# Patient Record
Sex: MALE | Race: WHITE | NOT HISPANIC OR LATINO | Employment: FULL TIME | URBAN - METROPOLITAN AREA
[De-identification: names, ages, dates, MRNs, and addresses within clinical notes are randomized per-mention and may not be internally consistent; named-entity substitution may affect disease eponyms.]

---

## 2018-08-06 ENCOUNTER — APPOINTMENT (OUTPATIENT)
Dept: RADIOLOGY | Facility: MEDICAL CENTER | Age: 33
End: 2018-08-06
Attending: EMERGENCY MEDICINE
Payer: OTHER MISCELLANEOUS

## 2018-08-06 ENCOUNTER — HOSPITAL ENCOUNTER (EMERGENCY)
Facility: MEDICAL CENTER | Age: 33
End: 2018-08-06
Attending: EMERGENCY MEDICINE
Payer: OTHER MISCELLANEOUS

## 2018-08-06 VITALS
WEIGHT: 185.19 LBS | DIASTOLIC BLOOD PRESSURE: 78 MMHG | OXYGEN SATURATION: 98 % | SYSTOLIC BLOOD PRESSURE: 128 MMHG | HEIGHT: 72 IN | BODY MASS INDEX: 25.08 KG/M2 | HEART RATE: 62 BPM | TEMPERATURE: 98.2 F | RESPIRATION RATE: 15 BRPM

## 2018-08-06 DIAGNOSIS — S43.401A SPRAIN OF RIGHT SHOULDER, UNSPECIFIED SHOULDER SPRAIN TYPE, INITIAL ENCOUNTER: ICD-10-CM

## 2018-08-06 PROCEDURE — 73030 X-RAY EXAM OF SHOULDER: CPT | Mod: RT

## 2018-08-06 PROCEDURE — A9270 NON-COVERED ITEM OR SERVICE: HCPCS | Performed by: EMERGENCY MEDICINE

## 2018-08-06 PROCEDURE — 700102 HCHG RX REV CODE 250 W/ 637 OVERRIDE(OP): Performed by: EMERGENCY MEDICINE

## 2018-08-06 PROCEDURE — 99283 EMERGENCY DEPT VISIT LOW MDM: CPT

## 2018-08-06 RX ORDER — IBUPROFEN 600 MG/1
600 TABLET ORAL ONCE
Status: COMPLETED | OUTPATIENT
Start: 2018-08-06 | End: 2018-08-06

## 2018-08-06 RX ORDER — ACETAMINOPHEN 325 MG/1
650 TABLET ORAL ONCE
Status: COMPLETED | OUTPATIENT
Start: 2018-08-06 | End: 2018-08-06

## 2018-08-06 RX ADMIN — IBUPROFEN 600 MG: 600 TABLET, FILM COATED ORAL at 16:00

## 2018-08-06 RX ADMIN — ACETAMINOPHEN 650 MG: 325 TABLET, FILM COATED ORAL at 16:00

## 2018-08-06 ASSESSMENT — PAIN SCALES - WONG BAKER: WONGBAKER_NUMERICALRESPONSE: HURTS JUST A LITTLE BIT

## 2018-08-06 NOTE — LETTER
FORM C-4:  EMPLOYEE’S CLAIM FOR COMPENSATION/ REPORT OF INITIAL TREATMENT  EMPLOYEE’S CLAIM - PROVIDE ALL INFORMATION REQUESTED   First Name  Shadi Last Name  Dana Birthdate             Age  1985 33 y.o. 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 (if applicable)  Building Fence    How did this injury or occupational disease occur? Be specific and answer in detail. Use additional sheet if necessary)  Pounding fence post and continuing working for 2 days   If you believe that you have an occupational disease, when did you first have knowledge of the disability and it relationship to your employment?  n/a Witnesses to the Accident  Dm Card     Nature of Injury or Occupational Disease  Workers' Compensation  Part(s) of Body Injured or Affected  Shoulder (R), N/A, N/A    I certify that the above is true and correct to the best of my knowledge and that I have provided this information in  order to obtain the benefits of [place]  I hereby authorize any physician, chiropractor, surgeon, practitioner, or other person, any hospital, including [facility] or [facility] hospital, any medical service organization, any insurance company, or other institution or organization to release to each other, any medical or other information, including benefits paid or payable, pertinent to this injury or disease, except information relative to diagnosis, treatment and/or counseling for AIDS, psychological conditions, alcohol or controlled substances, for which I must give specific authorization.  A Photostat of this authorization shall be as valid as the original.   Date 08/06/2018 Place  [facility] Employee’s Signature   THIS REPORT MUST BE COMPLETED AND MAILED WITHIN 3 WORKING DAYS OF TREATMENT   Place  [facility], EMERGENCY DEPT  Name of Facility   [facility]   Date  8/6/2018 Diagnosis  (S43.401A) Sprain of right shoulder, unspecified shoulder sprain type, initial encounter Is there evidence the injured employee was under the influence of alcohol and/or another controlled substance at the time of accident?   Hour  5:46 PM Description of Injury or Disease  Sprain of right shoulder, unspecified shoulder sprain type, initial encounter No   Treatment  Sling  Have you advised the patient to remain off work five days or more?         No   X-Ray Findings  Negative   If Yes   From Date    To Date      From information given by the employee, together with medical evidence, can you directly connect this injury or occupational disease as job incurred?  Yes If No, is the employee capable of: Full Duty    Modified Duty  Yes   Is additional medical care by a physician indicated?  Yes If Modified Duty, Specify any Limitations / Restrictions  No use of the right arm for the next 4  "days     Do you know of any previous injury or disease contributing to this condition or occupational disease?  No   Date  8/6/2018 Print Doctor’s Name  Se Ramos certify the employer’s copy of this form was mailed on:   Address  [address] [address] [phone] Insurer’s Use Only   [facility]  [address]    Provider’s Tax ID Number  [id number] Telephone  Dept: [phone]    Doctor’s Signature  e-SignSE RAMOS M.D. Degree   MD    Original - TREATING PHYSICIAN OR CHIROPRACTOR   Pg 2-Insurer/TPA   Pg 3-Employer   Pg 4-Employee                                                                                                  Form C-4 (rev01/03)     BRIEF DESCRIPTION OF RIGHTS AND BENEFITS  (Pursuant to [place] 616C.050)    Notice of Injury or Occupational Disease (Incident Report Form C-1): If an injury or occupational disease (OD) arises out of and in the course of employment, you must provide written notice to your employer as soon as practicable, but no later than 7 days after the accident or OD. Your employer shall maintain a sufficient supply of the required forms.  Claim for Compensation (Form C-4): If medical treatment is sought, the form C-4 is available at the place of initial treatment. A completed \"Claim for Compensation\" (Form C-4) must be filed within 90 days after an accident or OD. The treating physician or chiropractor must, within 3 working days after treatment, complete and mail to the employer, the employer's insurer and third-party , the Claim for Compensation.  Medical Treatment: If you require medical treatment for your on-the-job injury or OD, you may be required to select a physician or chiropractor from a list provided by your workers’ compensation insurer, if it has contracted with an Organization for Managed Care (MCO) or Preferred Provider Organization (PPO) or providers of health care. If your employer has not entered into a " contract with an MCO or PPO, you may select a physician or chiropractor from the Panel of Physicians and Chiropractors. Any medical costs related to your industrial injury or OD will be paid by your insurer.  Temporary Total Disability (TTD): If your doctor has certified that you are unable to work for a period of at least 5 consecutive days, or 5 cumulative days in a 20-day period, or places restrictions on you that your employer does not accommodate, you may be entitled to TTD compensation.  Temporary Partial Disability (TPD): If the wage you receive upon reemployment is less than the compensation for TTD to which you are entitled, the insurer may be required to pay you TPD compensation to make up the difference. TPD can only be paid for a maximum of 24 months.  Permanent Partial Disability (PPD): When your medical condition is stable and there is an indication of a PPD as a result of your injury or OD, within 30 days, your insurer must arrange for an evaluation by a rating physician or chiropractor to determine the degree of your PPD. The amount of your PPD award depends on the date of injury, the results of the PPD evaluation and your age and wage.  Permanent Total Disability (PTD): If you are medically certified by a treating physician or chiropractor as permanently and totally disabled and have been granted a PTD status by your insurer, you are entitled to receive monthly benefits not to exceed 66 2/3% of your average monthly wage. The amount of your PTD payments is subject to reduction if you previously received a PPD award.  Vocational Rehabilitation Services: You may be eligible for vocational rehabilitation services if you are unable to return to the job due to a permanent physical impairment or permanent restrictions as a result of your injury or occupational disease.  Transportation and Per Ish Reimbursement: You may be eligible for travel expenses and per ish associated with medical  treatment.  Reopening: You may be able to reopen your claim if your condition worsens after claim closure.  Appeal Process: If you disagree with a written determination issued by the insurer or the insurer does not respond to your request, you may appeal to the Department of Administration, , by following the instructions contained in your determination letter. You must appeal the determination within 70 days from the date of the determination letter at [address], [address], [place], [place] [address], or [address], [address], [place], [place] [address]. If you disagree with the  decision, you may appeal to the Department of Administration, . You must file your appeal within 30 days from the date of the  decision letter at [address], [address], [place], [place] [address], or [address], [address], [place], [place] [address]. If you disagree with a decision of an , you may file a petition for judicial review with the District Court. You must do so within 30 days of the Appeal Officer’s decision. You may be represented by an  at your own expense or you may contact the [facility] for possible representation.  [place]  for Injured Workers (NAIW): If you disagree with a  decision, you may request that NAIW represent you without charge at an  Hearing. For information regarding denial of benefits, you may contact the [facility] at: [address], [address], [place] [address], [phone], or [address], [address], [place] [address], [phone]  To File a Complaint with the Division: If you wish to file a complaint with the  of the Division of Industrial Relations (DIR), please contact the Workers’ Compensation Section, [address], [address], [place], [place] [address], telephone [phone], or [address], [address], [place], [place] [address], telephone [phone]) [phone].  For assistance with Workers’ Compensation Issues: you may contact the Office of the Governor Consumer Health Assistance, [address] [place] [address], [address], [place], [place] [address], Toll Free 1-468.864.8805, Web site: http://[employer] [place] [email or web address]., E-mail [email or web address]                                                                                                                                                                               __________________________________________________________________                                    08/06/2018            Employee Name / Signature                                                                                                                            Date                                       D-2 (rev. 10/07)

## 2018-08-06 NOTE — ED PROVIDER NOTES
ED Provider Note    CHIEF COMPLAINT  Chief Complaint   Patient presents with   • Shoulder Pain     R shoulder, started last night, worse this morning, psent 2 days pounding fence posts, no swelling noticed       HPI  Shadi Cortez is a 33 y.o. male who presents for evaluation of right shoulder pain, he was hammering fence post into the ground setting up for burning man when he injured his right shoulder 2 days ago.  No weakness, numbness, no fever.  Has been using it with increasing pain since then.  No neck or back pain, no other complaints    REVIEW OF SYSTEMS  Negative for fever, weakness, numbness, neck pain, back pain.    PAST MEDICAL HISTORY       SOCIAL HISTORY  Social History     Social History Main Topics   • Smoking status: Current Every Day Smoker   • Smokeless tobacco: Never Used   • Alcohol use Yes   • Drug use: Yes     Types: Inhaled      Comment: marijuana   • Sexual activity: Not on file       SURGICAL HISTORY   has a past surgical history that includes hernia repair.    CURRENT MEDICATIONS  I personally reviewed the medication list in the charting documentation.     ALLERGIES  No Known Allergies    PHYSICAL EXAM  VITAL SIGNS: /77   Pulse 62   Temp 36.8 °C (98.2 °F)   Resp 18   Ht 1.829 m (6')   Wt 84 kg (185 lb 3 oz)   SpO2 98%   BMI 25.12 kg/m²   Constitutional: Alert in no apparent distress.  HENT: No signs of trauma.   Eyes: Conjunctiva normal, Non-icteric.   Chest: Normal nonlabored respirations  Skin: No erythema, No rash.   Musculoskeletal: Limited range of motion of the right shoulder but normal inspection without edema or deformity, distally neurovascularly intact with normal radial pulse and sensation.  Neurologic: Alert, No focal deficits noted.   Psychiatric: Affect normal, Judgment normal.    DIAGNOSTIC STUDIES / PROCEDURES    RADIOLOGY  DX-SHOULDER 2+ RIGHT   Final Result      No acute osseous abnormality.            COURSE & MEDICAL DECISION MAKING  Pertinent  Labs & Imaging studies reviewed. (See chart for details)    Encounter Summary: This is a 33 y.o. male with right sided shoulder pain after overuse with a hammer setting of a burning man, no focal neurologic complaints or findings on exam, limited range of motion on exam however.  He is neurovascularly intact.  Will place in a sling as the x-ray is negative for acute fractures, he will follow-up with orthopedics.  Strict return instructions discussed.      DISPOSITION: Discharge Home      FINAL IMPRESSION  1. Sprain of right shoulder, unspecified shoulder sprain type, initial encounter        This dictation was created using voice recognition software. The accuracy of the dictation is limited to the abilities of the software. I expect there may be some errors of grammar and possibly content. The nursing notes were reviewed and certain aspects of this information were incorporated into this note.    Electronically signed by: Se Ramos, 8/6/2018 4:37 PM

## 2018-08-06 NOTE — ED TRIAGE NOTES
"Ambulates to triage  Chief Complaint   Patient presents with   • Shoulder Pain     R shoulder, started last night, worse this morning, psent 2 days pounding fence posts, no swelling noticed     Pain increases when he lifts his arm to shoulder height, \"sharp shooting pain travels down R arm.\"  "

## 2018-08-06 NOTE — LETTER
FORM C-4:  EMPLOYEE’S CLAIM FOR COMPENSATION/ REPORT OF INITIAL TREATMENT  EMPLOYEE’S CLAIM - PROVIDE ALL INFORMATION REQUESTED   First Name  Shadi Last Name  Dana Birthdate             Age  1985 33 y.o. 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 (if applicable)  Building Fence    How did this injury or occupational disease occur? Be specific and answer in detail. Use additional sheet if necessary)  Pounding fence post and continuing working for 2 days   If you believe that you have an occupational disease, when did you first have knowledge of the disability and it relationship to your employment?  n/a Witnesses to the Accident  Dm Card     Nature of Injury or Occupational Disease  Workers' Compensation  Part(s) of Body Injured or Affected  Shoulder (R), N/A, N/A    I certify that the above is true and correct to the best of my knowledge and that I have provided this information in  order to obtain the benefits of [place] inclusive or Chapter 617 of [place]RS).  I hereby authorize any physician, chiropractor, surgeon, practitioner, or other person, any hospital, including [facility] or [facility] hospital, any medical service organization, any insurance company, or other institution or organization to release to each other, any medical or other information, including benefits paid or payable, pertinent to this injury or disease, except information relative to diagnosis, treatment and/or counseling for AIDS, psychological conditions, alcohol or controlled substances, for which I must give specific authorization.  A Photostat of this authorization shall be as valid as the original.   Date 08/06/2018 Place  [facility] Employee’s Signature   THIS REPORT MUST BE COMPLETED AND MAILED WITHIN 3 WORKING DAYS OF TREATMENT   Place  [facility], EMERGENCY DEPT  Name of Facility   [facility]   Date  8/6/2018 Diagnosis  (S43.401A) Sprain of right shoulder, unspecified shoulder sprain type, initial encounter Is there evidence the injured employee was under the influence of alcohol and/or another controlled substance at the time of accident?   Hour  5:23 PM Description of Injury or Disease  Sprain of right shoulder, unspecified shoulder sprain type, initial encounter     Treatment     Have you advised the patient to remain off work five days or more?             X-Ray Findings      If Yes   From Date    To Date      From information given by the employee, together with medical evidence, can you directly connect this injury or occupational disease as job incurred?    If No, is the employee capable of: Full Duty    Modified Duty      Is additional medical care by a physician indicated?    If Modified Duty, Specify any Limitations / Restrictions        Do you know of any previous injury or disease  "contributing to this condition or occupational disease?      Date  8/6/2018 Print Doctor’s Name  Se Ramos MAIDA certify the employer’s copy of this form was mailed on:   Address  [address] [address] [phone] Insurer’s Use Only   [facility]  [address]    Provider’s Tax ID Number  [id number] Telephone  Dept: [phone]    Doctor’s Signature    Degree       Original - TREATING PHYSICIAN OR CHIROPRACTOR   Pg 2-Insurer/TPA   Pg 3-Employer   Pg 4-Employee                                                                                                  Form C-4 (rev01/03)     BRIEF DESCRIPTION OF RIGHTS AND BENEFITS  (Pursuant to NRS 616C.050)    Notice of Injury or Occupational Disease (Incident Report Form C-1): If an injury or occupational disease (OD) arises out of and in the course of employment, you must provide written notice to your employer as soon as practicable, but no later than 7 days after the accident or OD. Your employer shall maintain a sufficient supply of the required forms.    Claim for Compensation (Form C-4): If medical treatment is sought, the form C-4 is available at the place of initial treatment. A completed \"Claim for Compensation\" (Form C-4) must be filed within 90 days after an accident or OD. The treating physician or chiropractor must, within 3 working days after treatment, complete and mail to the employer, the employer's insurer and third-party , the Claim for Compensation.    Medical Treatment: If you require medical treatment for your on-the-job injury or OD, you may be required to select a physician or chiropractor from a list provided by your workers’ compensation insurer, if it has contracted with an Organization for Managed Care (MCO) or Preferred Provider Organization (PPO) or providers of health care. If your employer has not entered into a contract with an MCO or PPO, you may select a physician or chiropractor from the " Panel of Physicians and Chiropractors. Any medical costs related to your industrial injury or OD will be paid by your insurer.    Temporary Total Disability (TTD): If your doctor has certified that you are unable to work for a period of at least 5 consecutive days, or 5 cumulative days in a 20-day period, or places restrictions on you that your employer does not accommodate, you may be entitled to TTD compensation.    Temporary Partial Disability (TPD): If the wage you receive upon reemployment is less than the compensation for TTD to which you are entitled, the insurer may be required to pay you TPD compensation to make up the difference. TPD can only be paid for a maximum of 24 months.    Permanent Partial Disability (PPD): When your medical condition is stable and there is an indication of a PPD as a result of your injury or OD, within 30 days, your insurer must arrange for an evaluation by a rating physician or chiropractor to determine the degree of your PPD. The amount of your PPD award depends on the date of injury, the results of the PPD evaluation and your age and wage.    Permanent Total Disability (PTD): If you are medically certified by a treating physician or chiropractor as permanently and totally disabled and have been granted a PTD status by your insurer, you are entitled to receive monthly benefits not to exceed 66 2/3% of your average monthly wage. The amount of your PTD payments is subject to reduction if you previously received a PPD award.    Vocational Rehabilitation Services: You may be eligible for vocational rehabilitation services if you are unable to return to the job due to a permanent physical impairment or permanent restrictions as a result of your injury or occupational disease.    Transportation and Per Ish Reimbursement: You may be eligible for travel expenses and per ish associated with medical treatment.  Reopening: You may be able to reopen your claim if your condition worsens  after claim closure.    Appeal Process: If you disagree with a written determination issued by the insurer or the insurer does not respond to your request, you may appeal to the Department of Administration, , by following the instructions contained in your determination letter. You must appeal the determination within 70 days from the date of the determination letter at [address], [address], [place], [place] [address], or [address], [place], [place] [address]. If you disagree with the  decision, you may appeal to the Department of Administration, . You must file your appeal within 30 days from the date of the  decision letter at [address], [address], [place], [place] [address], or [address], [address], [place], [place] [address]. If you disagree with a decision of an , you may file a petition for judicial review with the District Court. You must do so within 30 days of the Appeal Officer’s decision. You may be represented by an  at your own expense or you may contact the [facility] for possible representation.    [place]  for Injured Workers (NAIW): If you disagree with a  decision, you may request that NAIW represent you without charge at an  Hearing. For information regarding denial of benefits, you may contact the NA at: [address], [address], [place] [address], [phone]) [phone], or [address], [address], [place] [address], [phone]    To File a Complaint with the Division: If you wish to file a complaint with the  of the Division of Industrial Relations (DIR), please contact the Workers’ Compensation Section, [address], [address], [place], [place] [address], telephone (020) [phone], or [address], [place] [address], telephone ([phone]) [phone].    For  assistance with Workers’ Compensation Issues: you may contact the Office of the Governor Consumer Health Assistance, [address] [place] [address], [place] [address], [place] [address], Toll Free 1-457.210.4785, Web site: http://govcha.[facility].[email or web address]., E-mail [email or web address].                                                                                                                                                                               __________________________________________________________________                                    _________________            Employee Name / Signature                                                                                                                            Date                                       D-2 (rev. 10/07)

## 2018-08-06 NOTE — LETTER
FORM C-4:  EMPLOYEE’S CLAIM FOR COMPENSATION/ REPORT OF INITIAL TREATMENT  EMPLOYEE’S CLAIM - PROVIDE ALL INFORMATION REQUESTED   First Name  Shadi Last Name  Dana Birthdate             Age  1985 33 y.o. 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 (if applicable)  Building Fence    How did this injury or occupational disease occur? Be specific and answer in detail. Use additional sheet if necessary)  Pounding fence post and continuing working for 2 days   If you believe that you have an occupational disease, when did you first have knowledge of the disability and it relationship to your employment?  n/a Witnesses to the Accident  Dm Card     Nature of Injury or Occupational Disease  Workers' Compensation  Part(s) of Body Injured or Affected  Shoulder (R), N/A, N/A    I certify that the above is true and correct to the best of my knowledge and that I have provided this information in order to obtain the benefits of [place]’s [place] and [place] Acts ([place] 616A to  616D, inclusive or Chapter 617 of NRS).  I hereby authorize any physician, chiropractor, surgeon, practitioner, or other person, any hospital, including [facility] or [facility] hospital, any medical service organization, any insurance company, or other institution or organization to release to each other, any medical or other information, including benefits paid or payable, pertinent to this injury or disease, except information relative to diagnosis, treatment and/or counseling for AIDS, psychological conditions, alcohol or controlled substances, for which I must give specific authorization.  A Photostat of this authorization shall be as valid as the original.   Date Place   Employee’s Signature   THIS REPORT MUST BE COMPLETED AND MAILED WITHIN 3 WORKING DAYS OF TREATMENT   Place  [facility], EMERGENCY DEPT  Name of Facility   [facility]   Date  8/6/2018 Diagnosis  (S43.401A) Sprain of right shoulder, unspecified shoulder sprain type, initial encounter Is there evidence the injured employee was under the influence of alcohol and/or another controlled substance at the time of accident?   Hour  5:21 PM Description of Injury or Disease  Sprain of right shoulder, unspecified shoulder sprain type, initial encounter     Treatment     Have you advised the patient to remain off work five days or more?             X-Ray Findings      If Yes   From Date    To Date      From information given by the employee, together with medical evidence, can you directly connect this injury or occupational disease as job incurred?    If No, is the employee capable of: Full Duty    Modified Duty      Is additional medical care by a physician indicated?    If Modified Duty, Specify any Limitations / Restrictions        Do you know of any previous injury or disease contributing to this condition or occupational disease?      Date  8/6/2018 Print Doctor’s Name  Se Ramos certify  "the employer’s copy of this form was mailed on:   Address  [address]  [place] [id number] [phone] Insurer’s Use Only   [facility]  [address]    Provider’s Tax ID Number  [id number] Telephone  Dept: [phone]    Doctor’s Signature    Degree       Original - TREATING PHYSICIAN OR CHIROPRACTOR   Pg 2-Insurer/TPA   Pg 3-Employer   Pg 4-Employee                                                                                                  Form C-4 (rev01/03)     BRIEF DESCRIPTION OF RIGHTS AND BENEFITS  (Pursuant to NRS 616C.050)    Notice of Injury or Occupational Disease (Incident Report Form C-1): If an injury or occupational disease (OD) arises out of and in the course of employment, you must provide written notice to your employer as soon as practicable, but no later than 7 days after the accident or OD. Your employer shall maintain a sufficient supply of the required forms.    Claim for Compensation (Form C-4): If medical treatment is sought, the form C-4 is available at the place of initial treatment. A completed \"Claim for Compensation\" (Form C-4) must be filed within 90 days after an accident or OD. The treating physician or chiropractor must, within 3 working days after treatment, complete and mail to the employer, the employer's insurer and third-party , the Claim for Compensation.    Medical Treatment: If you require medical treatment for your on-the-job injury or OD, you may be required to select a physician or chiropractor from a list provided by your workers’ compensation insurer, if it has contracted with an Organization for Managed Care (MCO) or Preferred Provider Organization (PPO) or providers of health care. If your employer has not entered into a contract with an MCO or PPO, you may select a physician or chiropractor from the Panel of Physicians and Chiropractors. Any medical costs related to your industrial injury or OD will be paid by your " insurer.    Temporary Total Disability (TTD): If your doctor has certified that you are unable to work for a period of at least 5 consecutive days, or 5 cumulative days in a 20-day period, or places restrictions on you that your employer does not accommodate, you may be entitled to TTD compensation.    Temporary Partial Disability (TPD): If the wage you receive upon reemployment is less than the compensation for TTD to which you are entitled, the insurer may be required to pay you TPD compensation to make up the difference. TPD can only be paid for a maximum of 24 months.    Permanent Partial Disability (PPD): When your medical condition is stable and there is an indication of a PPD as a result of your injury or OD, within 30 days, your insurer must arrange for an evaluation by a rating physician or chiropractor to determine the degree of your PPD. The amount of your PPD award depends on the date of injury, the results of the PPD evaluation and your age and wage.    Permanent Total Disability (PTD): If you are medically certified by a treating physician or chiropractor as permanently and totally disabled and have been granted a PTD status by your insurer, you are entitled to receive monthly benefits not to exceed 66 2/3% of your average monthly wage. The amount of your PTD payments is subject to reduction if you previously received a PPD award.    Vocational Rehabilitation Services: You may be eligible for vocational rehabilitation services if you are unable to return to the job due to a permanent physical impairment or permanent restrictions as a result of your injury or occupational disease.    Transportation and Per Ish Reimbursement: You may be eligible for travel expenses and per ish associated with medical treatment.  Reopening: You may be able to reopen your claim if your condition worsens after claim closure.    Appeal Process: If you disagree with a written determination issued by the insurer or the  insurer does not respond to your request, you may appeal to the Department of Administration, , by following the instructions contained in your determination letter. You must appeal the determination within 70 days from the date of the determination letter at [address], [address], [place], [place] [address], or [address], [address], [place], [place] [address]. If you disagree with the  decision, you may appeal to the Department of Administration, . You must file your appeal within 30 days from the date of the  decision letter at [address], [address], [place], [place] [address], or [address], [address], [place], [place] [address]. If you disagree with a decision of an , you may file a petition for judicial review with the District Court. You must do so within 30 days of the Appeal Officer’s decision. You may be represented by an  at your own expense or you may contact the [facility] for possible representation.    [place]  for Injured Workers (NAIW): If you disagree with a  decision, you may request that NAIW represent you without charge at an  Hearing. For information regarding denial of benefits, you may contact the [facility] at: [address], [address], [place] [address], [phone]) [phone], or [address], [address], [place] [address], [phone]    To File a Complaint with the Division: If you wish to file a complaint with the  of the Division of Industrial Relations (DIR), please contact the Workers’ Compensation Section, [address], [address], [place], [place] [address], telephone (816) 627-7091, or [address], [place] [address], telephone [phone]) [phone].    For assistance with Workers’ Compensation Issues: you may contact the Office of the [facility] Consumer Health Assistance, [address], [address], [place], [place] [address], Toll Free 1-905.802.1149, Web site: http://[email or web address].[email or web address]., E-mail [email or web address].                                                                                                                                                                               __________________________________________________________________                                    _________________            Employee Name / Signature                                                                                                                            Date                                       D-2 (rev. 10/07)

## 2018-08-07 NOTE — ED NOTES
Pt verbalized understanding of D/C instructions, pt PWD, VSS, NAD, pt left walking with steady gait

## 2019-01-20 ENCOUNTER — OCCUPATIONAL MEDICINE (OUTPATIENT)
Dept: URGENT CARE | Facility: PHYSICIAN GROUP | Age: 34
End: 2019-01-20
Payer: COMMERCIAL

## 2019-01-20 VITALS
BODY MASS INDEX: 23.84 KG/M2 | HEART RATE: 80 BPM | WEIGHT: 176 LBS | SYSTOLIC BLOOD PRESSURE: 104 MMHG | OXYGEN SATURATION: 97 % | DIASTOLIC BLOOD PRESSURE: 64 MMHG | HEIGHT: 72 IN | TEMPERATURE: 97.7 F

## 2019-01-20 DIAGNOSIS — S46.911A SHOULDER STRAIN, RIGHT, INITIAL ENCOUNTER: Primary | ICD-10-CM

## 2019-01-20 PROCEDURE — 99203 OFFICE O/P NEW LOW 30 MIN: CPT | Mod: 29 | Performed by: NURSE PRACTITIONER

## 2019-01-20 ASSESSMENT — ENCOUNTER SYMPTOMS
FOCAL WEAKNESS: 0
CARDIOVASCULAR NEGATIVE: 1
SHORTNESS OF BREATH: 0
GASTROINTESTINAL NEGATIVE: 1
DIZZINESS: 0
TINGLING: 0
RESPIRATORY NEGATIVE: 1
EYES NEGATIVE: 1
PSYCHIATRIC NEGATIVE: 1
NEUROLOGICAL NEGATIVE: 1
CONSTITUTIONAL NEGATIVE: 1
SENSORY CHANGE: 0

## 2019-01-20 ASSESSMENT — PAIN SCALES - GENERAL: PAINLEVEL: 4=SLIGHT-MODERATE PAIN

## 2019-01-20 NOTE — LETTER
"   [facility] [place]  [address]  [place] [address]  Phone:  [phone] - Fax:  [phone]   [facility] Progress Report and Disability Certification  Date of Service: 1/20/2019   No Show:  No  Date / Time of Next Visit: 1/23/2019   Claim Information   Patient Name: Shadi Cortez  Claim Number:     Employer:  [employer] Date of Injury: 1/19/2019     Insurer / TPA: [employer]  ID / SSN:     Occupation:   Diagnosis: The encounter diagnosis was Shoulder strain, right, initial encounter.    Medical Information   Related to Industrial Injury? Yes    Subjective Complaints:  DOI: 1/19/2019. WC. Initial visit. He works as a  for [employer]. 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 The unit impacted part of the line while he was holding it and sent a \"shock wave\" up his arm and now has right shoulder pain. Rates pain as 4/10 which has improved since last night. He has been taking ibuprofen, acetaminophen, and resting it. He reports history of R shoulder sprain last year which has been stable. Some pain with ROM of right shoulder, otherwise denies numbness, tingling, weakness, or other symptoms.   Objective Findings: Physical Exam   Constitutional: He is oriented to person, place, and time. He appears well-developed and well-nourished. He is cooperative. No distress.   HENT:   Head: Normocephalic.   Right Ear: External ear normal.   Left Ear: External ear normal.   Nose: Nose normal.   Eyes: Conjunctivae and EOM are normal.   Neck: Normal range of motion.   Cardiovascular: Normal rate, regular rhythm, normal heart sounds and normal pulses.    Pulmonary/Chest: Effort normal and breath sounds normal. No respiratory distress.   Abdominal: Soft. Bowel sounds are normal.   Musculoskeletal: He exhibits no deformity.        Right shoulder: He exhibits decreased range of motion " (minimal, due to pain) and pain. He exhibits no tenderness, no bony tenderness, no swelling, no deformity and no laceration.        Right hand: Normal. He exhibits normal capillary refill. Normal sensation noted. Normal strength noted.   Neurological: He is alert and oriented to person, place, and time. He has normal strength. No sensory deficit.   Skin: Skin is warm and dry. Capillary refill takes less than 2 seconds.   Psychiatric: He has a normal mood and affect.   Vitals reviewed.   Pre-Existing Condition(s):     Assessment:   Initial Visit    Status: Additional Care Required  Permanent Disability:No    Plan:      Diagnostics:      Comments:  Patient will be released to restricted duty consisting of no pulling, pushing, lifting, or reaching with right arm; no climbing. Continue with OTC ibuprofen, acetaminophen, and RICE. Follow-up in 3 days on 1/23/2019. Return sooner if symptoms worsen.    Disability Information   Status: Released to Restricted Duty    From:  1/20/2019  Through: 1/23/2019 Restrictions are: Temporary   Physical Restrictions   Sitting:    Standing:    Stooping:    Bending:      Squatting:    Walking:    Climbing:    Pushing:      Pulling:    Other:    Reaching Above Shoulder (L):   Reaching Above Shoulder (R):       Reaching Below Shoulder (L):    Reaching Below Shoulder (R):      Not to exceed Weight Limits   Carrying(hrs):   Weight Limit(lb):   Lifting(hrs):   Weight  Limit(lb):     Comments: No pulling, pushing, lifting, or reaching with right arm; no climbing.    Repetitive Actions   Hands: i.e. Fine Manipulations from Grasping:     Feet: i.e. Operating Foot Controls:     Driving / Operate Machinery:     Physician Name: JOSE DANIEL Guajardo Physician Signature: JOSÉ Miller e-Signature: Dr. Carlos Carrizales, Medical Director   Clinic Name / Location: [facility] [address] [address] Clinic Phone Number: Dept: [phone]      Appointment Time: 9:30 Am Visit Start Time: 9:53 AM   Check-In Time:  9:34 Am Visit Discharge Time:  10:20 AM   Original-Treating Physician or Chiropractor    Page 2-Insurer/TPA    Page 3-Employer    Page 4-Employee

## 2019-01-20 NOTE — PROGRESS NOTES
"Subjective:     Shadi Cortez is a 33 y.o. male who presents for Work-Related Injury (DOI 1/18/19, Right shoulder strain)    DOI: 1/19/2019. WC. Initial visit. He works as a  for [employer]. 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 The unit impacted part of the line while he was holding it and sent a \"shock wave\" up his arm and now has right shoulder pain. Rates pain as 4/10 which has improved since last night. He has been taking ibuprofen, acetaminophen, and resting it. He reports history of R shoulder sprain last year which has been stable. Some pain with ROM of right shoulder, otherwise denies numbness, tingling, weakness, or other symptoms.  HPI    PMH:  has no past medical history on file.    MEDS: No current outpatient prescriptions on file.    ALLERGIES: No Known Allergies    SURGHX:   Past Surgical History:   Procedure Laterality Date   • HERNIA REPAIR       SOCHX:  reports that he has been smoking.  He has never used smokeless tobacco. He reports that he drinks alcohol. He reports that he uses drugs, including Inhaled.     FH: Reviewed with patient, not pertinent to this visit.     Review of Systems   Constitutional: Negative.    HENT: Negative.    Eyes: Negative.    Respiratory: Negative.  Negative for shortness of breath.    Cardiovascular: Negative.  Negative for chest pain.   Gastrointestinal: Negative.    Genitourinary: Negative.    Musculoskeletal:        R shoulder pain   Skin: Negative.  Negative for rash.   Neurological: Negative.  Negative for dizziness, tingling, sensory change and focal weakness.   Psychiatric/Behavioral: Negative.    All other systems reviewed and are negative.    Objective:     /64   Pulse 80   Temp 36.5 °C (97.7 °F) (Temporal)   Ht 1.829 m (6')   Wt 79.8 kg (176 lb)   SpO2 97%   BMI 23.87 kg/m²     Physical Exam   Constitutional: He is oriented to person, place, and time. He appears " well-developed and well-nourished. He is cooperative. No distress.   HENT:   Head: Normocephalic.   Right Ear: External ear normal.   Left Ear: External ear normal.   Nose: Nose normal.   Eyes: Conjunctivae and EOM are normal.   Neck: Normal range of motion.   Cardiovascular: Normal rate, regular rhythm, normal heart sounds and normal pulses.    Pulmonary/Chest: Effort normal and breath sounds normal. No respiratory distress.   Abdominal: Soft. Bowel sounds are normal.   Musculoskeletal: He exhibits no deformity.        Right shoulder: He exhibits decreased range of motion (minimal, due to pain) and pain. He exhibits no tenderness, no bony tenderness, no swelling, no deformity and no laceration.        Right hand: Normal. He exhibits normal capillary refill. Normal sensation noted. Normal strength noted.   Neurological: He is alert and oriented to person, place, and time. He has normal strength. No sensory deficit.   Skin: Skin is warm and dry. Capillary refill takes less than 2 seconds.   Psychiatric: He has a normal mood and affect.   Vitals reviewed.    Assessment/Plan:     1. Shoulder strain, right, initial encounter    Patient will be released to restricted duty consisting of no pulling, pushing, lifting, or reaching with right arm; no climbing. Continue with OTC ibuprofen, acetaminophen, and RICE. Follow-up in 3 days on 1/23/2019. Return sooner if symptoms worsen.    Patient advised to: Return in 3 days (on 1/23/2019).    Differential diagnosis, natural history, supportive care, and indications for immediate follow-up discussed. All questions answered. Patient agrees with the plan of care.

## 2019-01-20 NOTE — LETTER
EMPLOYEE’S CLAIM FOR COMPENSATION/ REPORT OF INITIAL TREATMENT  FORM C-4    EMPLOYEE’S CLAIM - PROVIDE ALL INFORMATION REQUESTED   First Name  Shadi Last Name  Dana Birthdate                    1985                Sex  male Claim Number   Home Address  [address] Age  33 y.o. Height  1.829 m (6') Weight  79.8 kg (176 lb) [facility]     [facility] [place]  [address] Telephone  [phone] (home)    Mailing Address  [address] [facility] [place]  [address] Primary Language Spoken  English    Insurer  [employer] Third Party   [employer]   Employee's Occupation (Job Title) When Injury or Occupational Disease Occurred      Employer's Name    [employer] Telephone   [phone]   Employer Address   [address] [facility] Zip   [address]   Date of Injury  1/19/2019               Hour of Injury  10:00 PM Date Employer Notified  1/19/2019 Last Day of Work after Injury or Occupational Disease  1/19/2019 Supervisor to Whom Injury Reported  Chiki Orr   Address or Location of Accident (if applicable)  [[address]   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 (Be specific an answer in detail. Use additional sheet if necessary)  Pulling a heavy unit on the line. It shifted and I tried to slow it down. Unit impacted part of line while I was holding it. Sent shock wave up my arm. Shoulder pain.   If you believe that you have an occupational disease, when did you first have knowledge of the disability and it relationship to your employment?   Witnesses to the Accident  Myles Collado      Nature of Injury or Occupational Disease  Workers' Compensation  Part(s) of Body Injured or Affected  Shoulder (R), N/A, N/A    I certify that the above is true  and correct to the best of my knowledge and that I have provided this information in order to obtain the benefits of [place]  I hereby authorize any physician, chiropractor, surgeon, practitioner, or other person, any hospital, including [facility] or [facility], any medical service organization, any insurance company, or other institution or organization to release to each other, any medical or other information, including benefits paid or payable, pertinent to this injury or disease, except information relative to diagnosis, treatment and/or counseling for AIDS, psychological conditions, alcohol or controlled substances, for which I must give specific authorization.  A Photostat of this authorization shall be as valid as the original.     Date   Place   Employee’s Signature   THIS REPORT MUST BE COMPLETED AND MAILED WITHIN 3 WORKING DAYS OF TREATMENT   Place  [facility]  Name of Facility  [facility]   Date  1/20/2019 Diagnosis  (S46.911A) Shoulder strain, right, initial encounter  (primary encounter diagnosis) Is there evidence the injured employee was under the influence of alcohol and/or another controlled substance at the time of accident?   Hour  9:53 AM Description of Injury or Disease  The encounter diagnosis was Shoulder strain, right, initial encounter. No   Treatment  Patient will be released to restricted duty consisting of no pulling, pushing, lifting, or reaching with right arm; no climbing. Continue with OTC ibuprofen, acetaminophen, and RICE. Follow-up in 3 days on 1/23/2019. Return sooner if symptoms worsen.  Have you advised the patient to remain off work five days or more? No   X-Ray Findings      If Yes   From Date  To Date      From information given by the employee, together with medical evidence, can you directly connect this injury or occupational disease as  "job incurred?  Yes If No Full Duty  No Modified Duty  Yes   Is additional medical care by a physician indicated?  Yes If Modified Duty, Specify any Limitations / Restrictions  No pulling, pushing, lifting, or reaching with right arm; no climbing.   Do you know of any previous injury or disease contributing to this condition or occupational disease?                            No   Date  1/20/2019 Print Doctor’s Name JOSE DANIEL Guajardo certify the employer’s copy of  this form was mailed on:   Address  [address] Insurer’s Use Only     [facility] Zip  [address]    Provider’s Tax ID Number  [id number] Telephone  Dept: [phone]        e-JOSÉ Adam   e-Signature: Dr. Carlos Carrizales, Medical Director Degree  APRN        ORIGINAL-TREATING PHYSICIAN OR CHIROPRACTOR    PAGE 2-INSURER/TPA    PAGE 3-EMPLOYER    PAGE 4-EMPLOYEE             Form C-4 (rev10/07)              BRIEF DESCRIPTION OF RIGHTS AND BENEFITS  (Pursuant to [place]RS 616C.050)    Notice of Injury or Occupational Disease (Incident Report Form C-1): If an injury or occupational disease (OD) arises out of and in the  course of employment, you must provide written notice to your employer as soon as practicable, but no later than 7 days after the accident or  OD. Your employer shall maintain a sufficient supply of the required forms.    Claim for Compensation (Form C-4): If medical treatment is sought, the form C-4 is available at the place of initial treatment. A completed  \"Claim for Compensation\" (Form C-4) must be filed within 90 days after an accident or OD. The treating physician or chiropractor must,  within 3 working days after treatment, complete and mail to the employer, the employer's insurer and third-party , the Claim for  Compensation.    Medical Treatment: If you require medical treatment for your on-the-job injury or OD, you may be required to select a physician or  chiropractor " from a list provided by your workers’ compensation insurer, if it has contracted with an Organization for Managed Care (MCO) or  Preferred Provider Organization (PPO) or providers of health care. If your employer has not entered into a contract with an MCO or PPO, you  may select a physician or chiropractor from the Panel of Physicians and Chiropractors. Any medical costs related to your industrial injury or  OD will be paid by your insurer.    Temporary Total Disability (TTD): If your doctor has certified that you are unable to work for a period of at least 5 consecutive days, or 5  cumulative days in a 20-day period, or places restrictions on you that your employer does not accommodate, you may be entitled to TTD  compensation.    Temporary Partial Disability (TPD): If the wage you receive upon reemployment is less than the compensation for TTD to which you are  entitled, the insurer may be required to pay you TPD compensation to make up the difference. TPD can only be paid for a maximum of 24  months.    Permanent Partial Disability (PPD): When your medical condition is stable and there is an indication of a PPD as a result of your injury or  OD, within 30 days, your insurer must arrange for an evaluation by a rating physician or chiropractor to determine the degree of your PPD. The  amount of your PPD award depends on the date of injury, the results of the PPD evaluation and your age and wage.    Permanent Total Disability (PTD): If you are medically certified by a treating physician or chiropractor as permanently and totally disabled  and have been granted a PTD status by your insurer, you are entitled to receive monthly benefits not to exceed 66 2/3% of your average  monthly wage. The amount of your PTD payments is subject to reduction if you previously received a PPD award.    Vocational Rehabilitation Services: You may be eligible for vocational rehabilitation services if you are unable to return to the job  due to a  permanent physical impairment or permanent restrictions as a result of your injury or occupational disease.    Transportation and Per Ish Reimbursement: You may be eligible for travel expenses and per ish associated with medical treatment.    Reopening: You may be able to reopen your claim if your condition worsens after claim closure.    Appeal Process: If you disagree with a written determination issued by the insurer or the insurer does not respond to your request, you may  appeal to the Department of Administration, , by following the instructions contained in your determination letter. You must  appeal the determination within 70 days from the date of the determination letter at [address], [address], [place], [place]  [address], or [address], [address], [place], [place] [address]. If you disagree with the  decision, you may appeal to the  Department of Administration, . You must file your appeal within 30 days from the date of the  decision  letter at [address], [address], [place], [place] [address], or [address], [place], [place] [address]. If you  disagree with a decision of an , you may file a petition for judicial review with the District Court. You must do so within 30  days of the Appeal Officer’s decision. You may be represented by an  at your own expense or you may contact the [facility] for possible  representation.    [place]  for Injured Workers (NAIW): If you disagree with a  decision, you may request that NAIW represent you  without charge at an  Hearing. For information regarding denial of benefits, you may contact the [facility] at: [address], [address], [place] [address], [phone]) [phone], or [address], [address], [place] [address], [phone]) [phone]    To File a Complaint with the Division: If you  wish to file a complaint with the  of the Division of Industrial Relations (DIR),  please contact the Workers’ Compensation Section, [address], [address], [place], [place] [address], telephone [phone]) [phone], or  [address], [address], [place], [place] [address], telephone [phone].    For assistance with Workers’ Compensation Issues: you may contact the Office of the Governor Consumer Health Assistance, [address] [place] [address], [address], [place], [place] [address], Toll Free 1-153.726.7051, Web site: http://[employer].[facility].nv., E-mail  [email or web address].nv.                                                                                                                                                                                                                                   __________________________________________________________________                                                                   _________________                Employee Name / Signature                                                                                                                                                       Date                                                                                                                                                                                                     D-2 (rev. 10/07)

## 2019-01-23 ENCOUNTER — OCCUPATIONAL MEDICINE (OUTPATIENT)
Dept: URGENT CARE | Facility: PHYSICIAN GROUP | Age: 34
End: 2019-01-23
Payer: COMMERCIAL

## 2019-01-23 VITALS
HEART RATE: 72 BPM | HEIGHT: 72 IN | BODY MASS INDEX: 23.84 KG/M2 | SYSTOLIC BLOOD PRESSURE: 110 MMHG | OXYGEN SATURATION: 99 % | TEMPERATURE: 98 F | DIASTOLIC BLOOD PRESSURE: 80 MMHG | WEIGHT: 176 LBS

## 2019-01-23 DIAGNOSIS — S46.911D STRAIN OF RIGHT SHOULDER, SUBSEQUENT ENCOUNTER: ICD-10-CM

## 2019-01-23 PROCEDURE — 99213 OFFICE O/P EST LOW 20 MIN: CPT | Performed by: EMERGENCY MEDICINE

## 2019-01-23 ASSESSMENT — ENCOUNTER SYMPTOMS
FOCAL WEAKNESS: 0
FEVER: 0
NECK PAIN: 0

## 2019-01-23 ASSESSMENT — PAIN SCALES - GENERAL: PAINLEVEL: 2=MINIMAL-SLIGHT

## 2019-01-23 NOTE — LETTER
[facility] [place]  [address]  [place] [address]  Phone:  [phone] - Fax:  [phone]   Occupational Health Network Progress Report and Disability Certification  Date of Service: 1/23/2019   No Show:  No  Date / Time of Next Visit: 1/28/2019   Claim Information   Patient Name: Shadi Cortez  Claim Number:     Employer:   [employer] Date of Injury: 1/19/2019     Insurer / TPA: [employer]  ID / SSN:     Occupation:   Diagnosis: The encounter diagnosis was Strain of right shoulder, subsequent encounter.    Medical Information   Related to Industrial Injury? Yes    Subjective Complaints:  Date of injury: 01/19/2019. Injured at work: yes; states pain in right shoulder associated with catching a moving heavy object, pulling arm.  Previous injury: Right shoulder injury September 2018. Second job: none. Outside activity: none. Contributing medical illness: none. Severity: Improving. Prior treatment:  Ice/heat, OTC med. Location: right shoulder. Radiation: none. Numbness/tingling: none. Focal weakness: none.   Objective Findings: General: Alert, cooperative, no acute distress.  Neck: Normal range of motion, no bony tenderness.  Musculoskeletal-right shoulder: Tenderness right trapezius region, right anterior humeral head region.  Intact range of motion; no rotator cuff deficit.  Negative drop arm, negative Speed test.  Vascular: Right radial pulse intact.  Skin: Warm, dry, intact.  Neurological: Distal motor and sensory function intact.   Pre-Existing Condition(s):     Assessment:   Condition Improved    Status: Additional Care Required  Permanent Disability:No    Plan: Medication    Diagnostics:      Comments:       Disability Information   Status: Released to Restricted Duty    From:  1/23/2019  Through: 1/28/2019 Restrictions are: Temporary   Physical Restrictions   Sitting:    Standing:    Stooping:    Bending:      Squatting:     Walking:    Climbing:    Pushing:      Pulling:    Other:    Reaching Above Shoulder (L):   Reaching Above Shoulder (R): 0 hrs/day     Reaching Below Shoulder (L):    Reaching Below Shoulder (R):  < or = to 1 hrs/day   Not to exceed Weight Limits   Carrying(hrs):   Weight Limit(lb): < or = to 10 pounds Lifting(hrs):   Weight  Limit(lb): < or = to 10 pounds   Comments:      Repetitive Actions   Hands: i.e. Fine Manipulations from Grasping:     Feet: i.e. Operating Foot Controls:     Driving / Operate Machinery:     Physician Name: Raman Monroy M.D. Physician Signature: RAMAN Carlisle M.D. e-Signature: Dr. Carlos Carriazles, Medical Director   Clinic Name / Location: [address] [address] Clinic Phone Number: Dept: [phone]   Appointment Time: 5:00 Pm Visit Start Time: 5:02 PM   Check-In Time:  4:57 Pm Visit Discharge Time:  5:52 PM   Original-Treating Physician or Chiropractor    Page 2-Insurer/TPA    Page 3-Employer    Page 4-Employee

## 2019-01-24 NOTE — PROGRESS NOTES
Subjective:      Shadi Cortez is a 33 y.o. male who presents with Work-Related Injury (FV, DOI 1/19/19 right shoulder injury)      Date of injury: 01/19/2019. Injured at work: yes; states pain in right shoulder associated with catching a moving heavy object, pulling arm.  Previous injury: Right shoulder injury September 2018. Second job: none. Outside activity: none. Contributing medical illness: none. Severity: Improving. Prior treatment:  Ice/heat, OTC med. Location: right shoulder. Radiation: none. Numbness/tingling: none. Focal weakness: none.     HPI    Review of Systems   Constitutional: Negative for fever.   Musculoskeletal: Negative for neck pain.   Skin: Negative for rash.   Neurological: Negative for focal weakness.     PMH:  has no past medical history on file.  MEDS: No current outpatient prescriptions on file.  ALLERGIES: No Known Allergies  SURGHX:   Past Surgical History:   Procedure Laterality Date   • HERNIA REPAIR       SOCHX:  reports that he has been smoking.  He has never used smokeless tobacco. He reports that he drinks alcohol. He reports that he uses drugs, including Inhaled.  FH: family history is not on file.       Objective:     /80   Pulse 72   Temp 36.7 °C (98 °F) (Temporal)   Ht 1.829 m (6')   Wt 79.8 kg (176 lb)   SpO2 99%   BMI 23.87 kg/m²      Physical Exam    General: Alert, cooperative, no acute distress.  Neck: Normal range of motion, no bony tenderness.  Musculoskeletal-right shoulder: Tenderness right trapezius region, right anterior humeral head region.  Intact range of motion; no rotator cuff deficit.  Negative drop arm, negative Speed test.  Vascular: Right radial pulse intact.  Skin: Warm, dry, intact.  Neurological: Distal motor and sensory function intact.    Suspect trapezius area related to prior injury; tenderness over bicipital head likely new.  Assessment/Plan:     1. Strain of right shoulder, subsequent encounter  Continue ice/heat, OTC  analgesia PRN.  D 39 completed; recheck next week.

## 2019-01-29 ENCOUNTER — OCCUPATIONAL MEDICINE (OUTPATIENT)
Dept: URGENT CARE | Facility: PHYSICIAN GROUP | Age: 34
End: 2019-01-29
Payer: COMMERCIAL

## 2019-01-29 VITALS
WEIGHT: 176 LBS | OXYGEN SATURATION: 97 % | TEMPERATURE: 98.6 F | SYSTOLIC BLOOD PRESSURE: 114 MMHG | HEART RATE: 72 BPM | HEIGHT: 72 IN | BODY MASS INDEX: 23.84 KG/M2 | DIASTOLIC BLOOD PRESSURE: 68 MMHG

## 2019-01-29 DIAGNOSIS — S46.911D STRAIN OF RIGHT SHOULDER, SUBSEQUENT ENCOUNTER: ICD-10-CM

## 2019-01-29 PROCEDURE — 99213 OFFICE O/P EST LOW 20 MIN: CPT | Mod: 29 | Performed by: PHYSICIAN ASSISTANT

## 2019-01-29 ASSESSMENT — PAIN SCALES - GENERAL: PAINLEVEL: NO PAIN

## 2019-01-29 NOTE — LETTER
[facility] [place]  [address] [place] [address]  Phone:  [phone] - Fax:  [phone]   [facility] Progress Report and Disability Certification  Date of Service: 1/29/2019   No Show:  No  Date / Time of Next Visit: 2/5/2019 @5:30PM   Claim Information   Patient Name: Shadi Cortez  Claim Number:     Employer:   [employer] Date of Injury: 1/19/2019     Insurer / TPA: [employer]  ID / SSN:     Occupation:   Diagnosis: The encounter diagnosis was Strain of right shoulder, subsequent encounter.    Medical Information   Related to Industrial Injury? Yes    Subjective Complaints:  Patient presents today stating that he feels like his pain and ROM has improved. He states that he feels like he is ready to go back to work with some restrictions. He has been out of work since injury due to lack of light-duty options. He has been taking ibuprofen/acetaminophen and icing as needed.    Objective Findings: Constitutional: He is oriented to person, place, and time. He appears well-developed and well-nourished. No distress.   HENT:   Head: Normocephalic and atraumatic.   Eyes: Conjunctivae and EOM are normal.   Neck: Normal range of motion. No tracheal deviation present.   Cardiovascular: Normal rate and regular rhythm.    Pulmonary/Chest: Effort normal. No respiratory distress.   Musculoskeletal:        Right shoulder: He exhibits decreased range of motion and tenderness. He exhibits no bony tenderness, no swelling, no effusion, no deformity and no spasm.   Muscular tenderness in right trapezius and right deltoid. Limited ROM in abduction and flexion.    Neurological: He is alert and oriented to person, place, and time. No sensory deficit.   Skin: Skin is warm and dry. Capillary refill takes less than 2 seconds.   Psychiatric: He has a normal mood and affect. His behavior is normal. Judgment and thought content normal.       Pre-Existing Condition(s):     Assessment:   Condition Improved    Status: Additional Care Required  Permanent Disability:No    Plan:   Comments:Continue OTC ibuprofen/acetaminophen, icing prn    Diagnostics:      Comments:       Disability Information   Status: Released to Restricted Duty    From:  1/29/2019  Through: 2/5/2019 Restrictions are: Temporary   Physical Restrictions   Sitting:    Standing:    Stooping:    Bending:      Squatting:    Walking:    Climbing:    Pushing:      Pulling:    Other:    Reaching Above Shoulder (L):   Reaching Above Shoulder (R): 0 hrs/day     Reaching Below Shoulder (L):    Reaching Below Shoulder (R):      Not to exceed Weight Limits   Carrying(hrs):   Weight Limit(lb): < or = to 25 pounds  Comments:May lift heavier items with assistance Lifting(hrs):   Weight  Limit(lb): < or = to 25 pounds  Comments:May lift heavier items with assistance   Comments:      Repetitive Actions   Hands: i.e. Fine Manipulations from Grasping:     Feet: i.e. Operating Foot Controls:     Driving / Operate Machinery:     Physician Name: Bin Bledsoe P.A.-C. Physician Signature: BIN Atkins P.A.-C. e-Signature: Dr. Carlos Carrizales, Medical Director   Clinic Name / Location: [address] [address] Clinic Phone Number: Dept: [phone]   Appointment Time: 5:20 Pm Visit Start Time: 5:28 PM   Check-In Time:  5:09 Pm Visit Discharge Time:  6:44PM   Original-Treating Physician or Chiropractor    Page 2-Insurer/TPA    Page 3-Employer    Page 4-Employee

## 2019-01-30 ASSESSMENT — ENCOUNTER SYMPTOMS
TINGLING: 0
SENSORY CHANGE: 0

## 2019-01-30 NOTE — PROGRESS NOTES
Subjective:   Shadi Cortez is a 33 y.o. male who presents for Work-Related Injury (DOI 1/18/19 R shoulder better)    Patient presents today stating that he feels like his pain and ROM has improved. He states that he feels like he is ready to go back to work with some restrictions. He has been out of work since injury due to lack of light-duty options. He has been taking ibuprofen/acetaminophen and icing as needed.      Review of Systems   Musculoskeletal:        Positive for right shoulder pain   Neurological: Negative for tingling and sensory change.   All other systems reviewed and are negative.      PMH:  has no past medical history on file.  MEDS: No current outpatient prescriptions on file.  ALLERGIES: No Known Allergies  SURGHX:   Past Surgical History:   Procedure Laterality Date   • HERNIA REPAIR       SOCHX:  reports that he has been smoking.  He has never used smokeless tobacco. He reports that he drinks alcohol. He reports that he uses drugs, including Inhaled.  FH: Reviewed with patient, not pertinent to this visit.     Objective:   /68   Pulse 72   Temp 37 °C (98.6 °F) (Temporal)   Ht 1.829 m (6')   Wt 79.8 kg (176 lb)   SpO2 97%   BMI 23.87 kg/m²   Physical Exam   Constitutional: He is oriented to person, place, and time. He appears well-developed and well-nourished. No distress.   HENT:   Head: Normocephalic and atraumatic.   Eyes: Conjunctivae and EOM are normal.   Neck: Normal range of motion. No tracheal deviation present.   Cardiovascular: Normal rate and regular rhythm.    Pulmonary/Chest: Effort normal. No respiratory distress.   Musculoskeletal:        Right shoulder: He exhibits decreased range of motion and tenderness. He exhibits no bony tenderness, no swelling, no effusion, no deformity and no spasm.   Muscular tenderness in right trapezius and right deltoid. Limited ROM in abduction and flexion.    Neurological: He is alert and oriented to person, place, and time.  No sensory deficit.   Skin: Skin is warm and dry. Capillary refill takes less than 2 seconds.   Psychiatric: He has a normal mood and affect. His behavior is normal. Judgment and thought content normal.       Assessment/Plan:   1. Strain of right shoulder, subsequent encounter    - Advised to continue OTC ibuprofen/acetaminophen and icing prn  - Reduced work restrictions  - Follow up 1 week    Differential diagnosis, natural history, supportive care, and indications for immediate follow-up discussed.

## 2019-02-06 ENCOUNTER — OCCUPATIONAL MEDICINE (OUTPATIENT)
Dept: URGENT CARE | Facility: PHYSICIAN GROUP | Age: 34
End: 2019-02-06
Payer: COMMERCIAL

## 2019-02-06 VITALS
WEIGHT: 170 LBS | DIASTOLIC BLOOD PRESSURE: 78 MMHG | TEMPERATURE: 98.4 F | OXYGEN SATURATION: 96 % | HEIGHT: 72 IN | RESPIRATION RATE: 14 BRPM | SYSTOLIC BLOOD PRESSURE: 120 MMHG | HEART RATE: 86 BPM | BODY MASS INDEX: 23.03 KG/M2

## 2019-02-06 DIAGNOSIS — S46.911D STRAIN OF RIGHT SHOULDER, SUBSEQUENT ENCOUNTER: ICD-10-CM

## 2019-02-06 PROCEDURE — 99212 OFFICE O/P EST SF 10 MIN: CPT | Mod: 29 | Performed by: INTERNAL MEDICINE

## 2019-02-06 ASSESSMENT — ENCOUNTER SYMPTOMS
NECK PAIN: 0
BACK PAIN: 0
MYALGIAS: 0

## 2019-02-06 NOTE — LETTER
[facility] [place] [address]  Phone:  [phone] - Fax:  [phone]   [facility] Progress Report and Disability Certification  Date of Service: 2/6/2019   No Show:  No  Date / Time of Next Visit:     Claim Information   Patient Name: Shadi Cortez  Claim Number:     Employer:  [employer] Date of Injury: 1/19/2019     Insurer / TPA: [employer]  ID / SSN:     Occupation:   Diagnosis: The encounter diagnosis was Strain of right shoulder, subsequent encounter.    Medical Information   Related to Industrial Injury? Yes    Subjective Complaints:  Date of injury: 01/19/2019. Injured at work: yes; states pain in right shoulder associated with catching a moving heavy object, pulling arm.  Previous injury: Right shoulder injury September 2018. Second job: none. Outside activity: none. Contributing medical illness: none. Prior treatment:  Ice/heat, OTC med. Location: right shoulder. Radiation: none. Numbness/tingling: none. Focal weakness: none. Patient states he has noticed significant improvement and is ready to go back to work.    Objective Findings: Patient has no weakness in his right shoulder today. No tenderness to palpation.  ROM is normal.   Pre-Existing Condition(s):     Assessment:   Condition Improved    Status: Discharged /  MMI  Permanent Disability:No    Plan:      Diagnostics:      Comments:       Disability Information   Status: Released to Full Duty    From:     Through:   Restrictions are:     Physical Restrictions   Sitting:    Standing:    Stooping:    Bending:      Squatting:    Walking:    Climbing:    Pushing:      Pulling:    Other:    Reaching Above Shoulder (L):   Reaching Above Shoulder (R):       Reaching Below Shoulder (L):    Reaching Below Shoulder (R):      Not to exceed Weight Limits   Carrying(hrs):   Weight Limit(lb):   Lifting(hrs):   Weight  Limit(lb):     Comments:  Patient is medically released back to work for full duty.      Repetitive Actions   Hands: i.e. Fine Manipulations from Grasping:     Feet: i.e. Operating Foot Controls:     Driving / Operate Machinery:     Physician Name: Rodrigo Zurita P.A.-C. Physician Signature: RODRIGO Haskins P.A.-C. e-Signature: Dr. Carlos Carrizales, Medical Director   Clinic Name / Location: [address] [address] Clinic Phone Number: Dept: [phone]   Appointment Time: 5:50 Pm Visit Start Time: 5:52 PM   Check-In Time:  5:47 Pm Visit Discharge Time:  6: 30 PM   Original-Treating Physician or Chiropractor    Page 2-Insurer/TPA    Page 3-Employer    Page 4-Employee

## 2019-02-07 NOTE — PROGRESS NOTES
Subjective:      Shadi Cortez is a 33 y.o. male who presents with Shoulder Injury (wc fv shoulder injury its stiff in the mornings )      Date of injury: 01/19/2019. Injured at work: yes; states pain in right shoulder associated with catching a moving heavy object, pulling arm.  Previous injury: Right shoulder injury September 2018. Second job: none. Outside activity: none. Contributing medical illness: none. Prior treatment:  Ice/heat, OTC med. Location: right shoulder. Radiation: none. Numbness/tingling: none. Focal weakness: none. Patient states he has noticed significant improvement and is ready to go back to work.      Shoulder Injury      Patient is following up with a work related shoulder injury. He states he has not had to take any OTC medication since his last visit. He is no longer experiencing any issues with his shoulder. He states he is ready to go back to work.    Review of Systems   Musculoskeletal: Negative for back pain, joint pain, myalgias and neck pain.   All other systems reviewed and are negative.         Objective:     /78   Pulse 86   Temp 36.9 °C (98.4 °F) (Temporal)   Resp 14   Ht 1.829 m (6')   Wt 77.1 kg (170 lb)   SpO2 96%   BMI 23.06 kg/m²      Physical Exam   Constitutional: He is oriented to person, place, and time. Vital signs are normal. He appears well-developed and well-nourished. No distress.   HENT:   Head: Normocephalic and atraumatic.   Eyes: Pupils are equal, round, and reactive to light.   Cardiovascular: Normal rate, regular rhythm and normal heart sounds.    Pulmonary/Chest: Effort normal.   Musculoskeletal: Normal range of motion.   Neurological: He is alert and oriented to person, place, and time.   Skin: Skin is warm and dry.   Psychiatric: He has a normal mood and affect.   Vitals reviewed.      Patient has no weakness in his right shoulder today. No tenderness to palpation.  ROM is normal.       Assessment/Plan:     1. Strain of right  shoulder, subsequent encounter  Patient released back to work with no restrictions and full duty.    D39 completed.  Ehsan Zurita PA-C

## 2019-03-17 ENCOUNTER — HOSPITAL ENCOUNTER (OUTPATIENT)
Dept: RADIOLOGY | Facility: MEDICAL CENTER | Age: 34
End: 2019-03-17
Attending: NURSE PRACTITIONER

## 2019-03-17 ENCOUNTER — OCCUPATIONAL MEDICINE (OUTPATIENT)
Dept: URGENT CARE | Facility: PHYSICIAN GROUP | Age: 34
End: 2019-03-17
Payer: COMMERCIAL

## 2019-03-17 VITALS
DIASTOLIC BLOOD PRESSURE: 88 MMHG | HEART RATE: 82 BPM | SYSTOLIC BLOOD PRESSURE: 120 MMHG | OXYGEN SATURATION: 99 % | TEMPERATURE: 97.9 F | HEIGHT: 72 IN | BODY MASS INDEX: 23.7 KG/M2 | RESPIRATION RATE: 18 BRPM | WEIGHT: 175 LBS

## 2019-03-17 DIAGNOSIS — M25.511 ACUTE PAIN OF RIGHT SHOULDER: ICD-10-CM

## 2019-03-17 DIAGNOSIS — R20.2 NUMBNESS AND TINGLING IN RIGHT HAND: ICD-10-CM

## 2019-03-17 DIAGNOSIS — R20.0 NUMBNESS AND TINGLING IN RIGHT HAND: ICD-10-CM

## 2019-03-17 DIAGNOSIS — S46.911D RIGHT SHOULDER STRAIN, SUBSEQUENT ENCOUNTER: ICD-10-CM

## 2019-03-17 PROCEDURE — 99213 OFFICE O/P EST LOW 20 MIN: CPT | Mod: 29 | Performed by: NURSE PRACTITIONER

## 2019-03-17 PROCEDURE — 73030 X-RAY EXAM OF SHOULDER: CPT | Mod: RT

## 2019-03-17 NOTE — LETTER
"   [facility] [place] [address]  Phone:  [phone] - Fax:  [phone]   [facility] Progress Report and Disability Certification  Date of Service: 3/17/2019   No Show:  No  Date / Time of Next Visit: 3/21/2019 in Occupational Medicine   Claim Information   Patient Name: Shadi Cortez  Claim Number:     Employer:  [employer] Date of Injury: 1/19/2019     Insurer / TPA: [employer]  ID / SSN:     Occupation:   Diagnosis: Diagnoses of Acute pain of right shoulder, Numbness and tingling in right hand, and Right shoulder strain, subsequent encounter were pertinent to this visit.    Medical Information   Related to Industrial Injury? Yes    Subjective Complaints:  DOI 1/19/19. 5th encounter. H/o right shoulder pain from moving heavy object. Dx: right shoulder strain. Was d/c on 2/6/19 for this. States wanted to go back to work so wanted to be released to full duty, but states still had shoulder pain despite his request. Now has numbness/tingling in right hand x 2 weeks with right shoulder pain and inability to lift right arm above shoulder height. States now at \"different station\" of work. States no lifting above head or pushing heavy objects without help. Performing \"fine hand manipulation work\". Advil 400 mg 1x/ day or \"every other day\", \"infrequently\" used, states \"I do not like to take medications\". Using KT tape which helps with less pain in shoulder after work. When numbness/tingling worsens unable to make fist. Previous worker's comp claim to right shoulder with another job in 8/2018, xrays done, states \"possible rotator cuff\" problem, not seen in ortho for this or MRI done, seen in [facility] ER on 8/6/18.   Objective Findings: A/O x 4. No swelling in right hand, able to make fist with no problems at exam time. Equal hand  at this time. Decreased ROM of right shoulder joint to just shoulder height. " TTP at AC joint site and at posterior joint space. TTP at trapezius muscle at right upper back.   Pre-Existing Condition(s):     Assessment:   Condition Worsened    Status: Additional Care Required  Permanent Disability:No    Plan:      Diagnostics: X-ray    Comments:  FINDINGS: Bone mineralization is normal. There is no evidence of fracture or dislocation. There is no evidence of arthropathy. Soft tissues are normal.    Disability Information   Status: Released to Restricted Duty    From:  3/17/2019  Through: 3/21/2019 Restrictions are: Temporary   Physical Restrictions   Sitting:    Standing:    Stooping:    Bending:      Squatting:    Walking:    Climbing:    Pushin hrs/day   Pullin hrs/day Other:    Reaching Above Shoulder (L):   Reaching Above Shoulder (R): 0 hrs/day     Reaching Below Shoulder (L):    Reaching Below Shoulder (R):  0 hrs/day   Not to exceed Weight Limits   Carrying(hrs):   Weight Limit(lb): < or = to 10 pounds Lifting(hrs):   Weight  Limit(lb): < or = to 10 pounds   Comments: Refer to Occupational Medicine at this time due to worsening shoulder joint and post d/c status. Recommend Ibuprofen 600 mg every 6 hrs as needed for pain, ice application or throbbing pain/swelling, may continue KT tape to right shoulder for support/stability with movement. Must have assistance with push/pull with left arm only. Recheck on 3/21/19, per patient request.    Repetitive Actions   Hands: i.e. Fine Manipulations from Grasping: < or = to 6 hrs/day   Feet: i.e. Operating Foot Controls:     Driving / Operate Machinery:     Physician Name: ARMINDA MurphyNBRENDA Physician Signature: HUANG Borrego e-Signature: Dr. Carlos Carrizales, Medical Director   Clinic Name / Location: [facility] [address] [address] Clinic Phone Number: Dept: [phone]   Appointment Time: 10:40 Am Visit Start Time: 10:46 AM   Check-In Time:  10:37 Am Visit Discharge Time:   12:10 PM    Original-Treating Physician or Chiropractor    Page 2-Insurer/TPA    Page 3-Employer    Page 4-Employee

## 2019-03-18 ENCOUNTER — OCCUPATIONAL MEDICINE (OUTPATIENT)
Dept: OCCUPATIONAL MEDICINE | Facility: CLINIC | Age: 34
End: 2019-03-18
Payer: COMMERCIAL

## 2019-03-18 VITALS
HEIGHT: 72 IN | RESPIRATION RATE: 14 BRPM | SYSTOLIC BLOOD PRESSURE: 106 MMHG | WEIGHT: 175 LBS | DIASTOLIC BLOOD PRESSURE: 70 MMHG | BODY MASS INDEX: 23.7 KG/M2 | OXYGEN SATURATION: 97 % | HEART RATE: 100 BPM

## 2019-03-18 DIAGNOSIS — S46.911D STRAIN OF RIGHT SHOULDER, SUBSEQUENT ENCOUNTER: ICD-10-CM

## 2019-03-18 PROCEDURE — 99204 OFFICE O/P NEW MOD 45 MIN: CPT | Performed by: PREVENTIVE MEDICINE

## 2019-03-18 RX ORDER — DICLOFENAC SODIUM 75 MG/1
75 TABLET, DELAYED RELEASE ORAL 2 TIMES DAILY
Qty: 60 TAB | Refills: 0 | Status: SHIPPED | OUTPATIENT
Start: 2019-03-18

## 2019-03-18 ASSESSMENT — ENCOUNTER SYMPTOMS
SHORTNESS OF BREATH: 0
BRUISES/BLEEDS EASILY: 0
SENSORY CHANGE: 1
WEAKNESS: 0
MYALGIAS: 1
FALLS: 0
TINGLING: 1
PALPITATIONS: 0
FEVER: 0
CHILLS: 0
ORTHOPNEA: 0

## 2019-03-18 NOTE — PROGRESS NOTES
"Subjective:      Shadi Cortez is a 33 y.o. male who presents with Work-Related Injury (FV// DOI 1/19/2019//R shoulder injury )      DOI 1/19/19. 5th encounter. H/o right shoulder pain from moving heavy object. Dx: right shoulder strain. Was d/c on 2/6/19 for this. States wanted to go back to work so wanted to be released to full duty, but states still had shoulder pain despite his request. Now has numbness/tingling in right hand x 2 weeks with right shoulder pain and inability to lift right arm above shoulder height. States now at \"different station\" of work. States no lifting above head or pushing heavy objects without help. Performing \"fine hand manipulation work\". Advil 400 mg 1x/ day or \"every other day\", \"infrequently\" used, states \"I do not like to take medications\". Using KT tape which helps with less pain in shoulder after work. When numbness/tingling worsens unable to make fist. Previous worker's comp claim to right shoulder with another job in 8/2018, xrays done, states \"possible rotator cuff\" problem, not seen in ortho for this or MRI done, seen in [facility] ER on 8/6/18.     HPI  See above  PMH:  has no past medical history on file.  MEDS: No current outpatient prescriptions on file.  ALLERGIES: No Known Allergies  SURGHX:   Past Surgical History:   Procedure Laterality Date   • HERNIA REPAIR       SOCHX:  reports that he has been smoking Cigarettes.  He has never used smokeless tobacco. He reports that he uses drugs, including Inhaled. He reports that he does not drink alcohol.  FH: Family history was reviewed, no pertinent findings to report    Review of Systems   Constitutional: Negative for chills, fever and malaise/fatigue.   Respiratory: Negative for shortness of breath.    Cardiovascular: Negative for chest pain, palpitations and orthopnea.   Musculoskeletal: Positive for joint pain and myalgias. Negative for falls.   Skin: Negative for itching and rash.   Neurological: Positive for " tingling and sensory change. Negative for weakness.   Endo/Heme/Allergies: Does not bruise/bleed easily.   All other systems reviewed and are negative.         Objective:     /88   Pulse 82   Temp 36.6 °C (97.9 °F) (Temporal)   Resp 18   Ht 1.829 m (6')   Wt 79.4 kg (175 lb)   SpO2 99%   BMI 23.73 kg/m²      Physical Exam   Constitutional: He is oriented to person, place, and time. Vital signs are normal. He appears well-developed and well-nourished. He is active and cooperative.  Non-toxic appearance. He does not have a sickly appearance. He does not appear ill. No distress.   HENT:   Head: Normocephalic.   Eyes: Pupils are equal, round, and reactive to light. EOM are normal.   Cardiovascular: Normal rate.    Pulmonary/Chest: Effort normal and breath sounds normal. No accessory muscle usage. No respiratory distress.   Musculoskeletal: He exhibits tenderness. He exhibits no edema or deformity.        Right shoulder: He exhibits decreased range of motion, tenderness and pain. He exhibits no bony tenderness, no swelling, no effusion, no crepitus, no deformity, no laceration, no spasm, normal pulse and normal strength.        Right hand: He exhibits normal range of motion, no tenderness, no bony tenderness, normal two-point discrimination, normal capillary refill, no deformity, no laceration and no swelling. Normal sensation noted. Normal strength noted.   Neurological: He is alert and oriented to person, place, and time.   Skin: Skin is warm and dry. No rash noted. He is not diaphoretic. No erythema.   Vitals reviewed.      A/O x 4. No swelling in right hand, able to make fist with no problems at exam time. Equal hand  at this time. Decreased ROM of right shoulder joint to just shoulder height. TTP at AC joint site and at posterior joint space. TTP at trapezius muscle at right upper back.       Assessment/Plan:     1. Acute pain of right shoulder    - DX-SHOULDER 2+ RIGHT; Future    2. Numbness and  tingling in right hand      3. Right shoulder strain, subsequent encounter    Refer to Occupational Medicine at this time due to worsening shoulder joint and post d/c status. Recommend Ibuprofen 600 mg every 6 hrs as needed for pain, ice application or throbbing pain/swelling, may continue KT tape to right shoulder for support/stability with movement. Must have assistance with push/pull with left arm only. Recheck on 3/21/19, per patient request.

## 2019-03-18 NOTE — LETTER
[address],   [address] [place] [address]  Phone:  [phone] - Fax:  [phone]   Occupational Health [facility] Progress Report and Disability Certification  Date of Service: 3/18/2019   No Show:  No  Date / Time of Next Visit: 04/09/19 @ 8AM   Claim Information   Patient Name: Shadi Cortez  Claim Number:     Employer:   [employer] Date of Injury: 1/19/2019     Insurer / TPA: [employer]  ID / SSN:     Occupation:   Diagnosis: The encounter diagnosis was Strain of right shoulder, subsequent encounter.    Medical Information   Related to Industrial Injury? Yes    Subjective Complaints:  DOI 1/19/2019: 34 yo male presents with right shoulder pain.  He is pulling unit on the line when another unit impacted part of the line while he is holding it causing pain in his right shoulder.  He was initially improving and released to full duty at the beginning of February but had not improved very much in reality, patient states he just wanted to get back to work and so has to be released.  Patient states couple weeks ago that he started getting numbness and tingling in the right hand and continued shoulder pain and so return to the clinic.  Pain is mostly in the anterior and posterior shoulder.  Pain with movements above shoulder level.  Gets numbness and tingling at the tips of all of his fingers.  Not currently taking any medications.  He did have a prior right shoulder injury last year which resolved with minimal treatment.   Objective Findings: Right shoulder: No gross deformity.  Tenderness over the anterior GH and upper trapezius.  Limited range of motion to less than 90 degrees flexion and abduction.  Slight weakness with abduction.  Empty can test negative.  Speeds test negative.   Pre-Existing Condition(s):     Assessment:   Condition Same    Status: Additional Care Required  Permanent Disability:No    Plan:         Diagnostics:      Comments:  Referral for physical therapy  Prescribed diclofenac  Restricted duty  Follow-up 3 weeks if no improvement will consider MRI    Disability Information   Status: Released to Restricted Duty    From:  3/18/2019  Through: 4/10/2019 Restrictions are: Temporary   Physical Restrictions   Sitting:    Standing:    Stooping:    Bending:      Squatting:    Walking:    Climbing:    Pushing:      Pulling:    Other:    Reaching Above Shoulder (L):   Reaching Above Shoulder (R):       Reaching Below Shoulder (L):    Reaching Below Shoulder (R):      Not to exceed Weight Limits   Carrying(hrs):   Weight Limit(lb):   Lifting(hrs):   Weight  Limit(lb):     Comments:      Repetitive Actions   Hands: i.e. Fine Manipulations from Grasping:     Feet: i.e. Operating Foot Controls:     Driving / Operate Machinery:     Physician Name: Dorian Nur D.O. Physician Signature: DORAIN Mcgregor D.O. e-Signature: Dr. Carlos Carrizales, Medical Director   Clinic Name / Location: [address],   [address] [address] Clinic Phone Number: Dept: [phone]   Appointment Time: 11:00 Am Visit Start Time: 11:20 AM   Check-In Time:  11:06 Am Visit Discharge Time:  12:10 PM   Original-Treating Physician or Chiropractor    Page 2-Insurer/TPA    Page 3-Employer    Page 4-Employee

## 2019-03-18 NOTE — PROGRESS NOTES
Subjective:      Shadi Cortez is a 33 y.o. male who presents with Other (WC [employer] DOI 1/19/19 (Rt) shoulder, same, rm 16)      DOI 1/19/2019: 32 yo male presents with right shoulder pain.  He is pulling unit on the line when another unit impacted part of the line while he is holding it causing pain in his right shoulder.  He was initially improving and released to full duty at the beginning of February but had not improved very much in reality, patient states he just wanted to get back to work and so has to be released.  Patient states couple weeks ago that he started getting numbness and tingling in the right hand and continued shoulder pain and so return to the clinic.  Pain is mostly in the anterior and posterior shoulder.  Pain with movements above shoulder level.  Gets numbness and tingling at the tips of all of his fingers.  Not currently taking any medications.  He did have a prior right shoulder injury last year which resolved with minimal treatment.     HPI    ROS  ROS: All systems were reviewed on intake form, form was reviewed and signed. See scanned documents in media. Pertinent positives and negatives included in HPI.    PMH: No pertinent past medical history to this problem  MEDS: Medications were reviewed in Epic  ALLERGIES: No Known Allergies  SOCHX: Works as  at [employer]   FH: No pertinent family history to this problem     Objective:     /70   Pulse 100   Resp 14   Ht 1.829 m (6')   Wt 79.4 kg (175 lb)   SpO2 97%   BMI 23.73 kg/m²      Physical Exam   Constitutional: He is oriented to person, place, and time. He appears well-developed and well-nourished.   HENT:   Right Ear: External ear normal.   Left Ear: External ear normal.   Eyes: Conjunctivae and EOM are normal.   Cardiovascular: Normal rate.    Pulmonary/Chest: Effort normal.   Neurological: He is alert and oriented to person, place, and time.   Skin: Skin is warm and dry.   Psychiatric: He has a  normal mood and affect. Judgment normal.       Right shoulder: No gross deformity.  Tenderness over the anterior GH and upper trapezius.  Limited range of motion to less than 90 degrees flexion and abduction.  Slight weakness with abduction.  Empty can test negative.  Speeds test negative.       Assessment/Plan:     1. Strain of right shoulder, subsequent encounter  - diclofenac EC (VOLTAREN) 75 MG Tablet Delayed Response; Take 1 Tab by mouth 2 times a day.  Dispense: 60 Tab; Refill: 0  - REFERRAL TO PHYSICAL THERAPY Reason for Therapy: Eval/Treat/Report    Referral for physical therapy  Prescribed diclofenac  Restricted duty  Follow-up 3 weeks if no improvement will consider MRI

## 2019-04-04 ENCOUNTER — TELEPHONE (OUTPATIENT)
Dept: OCCUPATIONAL MEDICINE | Facility: CLINIC | Age: 34
End: 2019-04-04

## 2019-04-04 NOTE — TELEPHONE ENCOUNTER
Phone Number Called: [phone] (home)       Message: patient called this morning stating that he needed a note explaining his symptoms, which was caused by his diclofenac (drowsiness) since he was sent home from work. Dr. Nur did agree to writing a note. Called the patient to let him know he can  the note he stated that he was unable to  the note. He did ask if we could e-mail it to [facility]. He did ask if he should stop taking the medication and I told him that the  Did recommend the ibuprofen or tylenol. Followed up w/Dr. Nur about that and he said for him to take ibuprofen or tylenol only at work and that diclofenac not during working hours if he would like. Left a VM since I was unable to speak w/the patient.     Left Message for patient to call back: yes

## 2019-04-08 ENCOUNTER — TELEPHONE (OUTPATIENT)
Dept: OCCUPATIONAL MEDICINE | Facility: CLINIC | Age: 34
End: 2019-04-08

## 2019-04-09 ENCOUNTER — OCCUPATIONAL MEDICINE (OUTPATIENT)
Dept: OCCUPATIONAL MEDICINE | Facility: CLINIC | Age: 34
End: 2019-04-09
Payer: COMMERCIAL

## 2019-04-09 VITALS
HEIGHT: 72 IN | RESPIRATION RATE: 14 BRPM | WEIGHT: 170 LBS | OXYGEN SATURATION: 98 % | SYSTOLIC BLOOD PRESSURE: 118 MMHG | TEMPERATURE: 98.6 F | HEART RATE: 88 BPM | BODY MASS INDEX: 23.03 KG/M2 | DIASTOLIC BLOOD PRESSURE: 72 MMHG

## 2019-04-09 DIAGNOSIS — S46.911D STRAIN OF RIGHT SHOULDER, SUBSEQUENT ENCOUNTER: ICD-10-CM

## 2019-04-09 PROCEDURE — 99213 OFFICE O/P EST LOW 20 MIN: CPT | Performed by: PREVENTIVE MEDICINE

## 2019-04-09 ASSESSMENT — ENCOUNTER SYMPTOMS
SENSORY CHANGE: 1
TINGLING: 1

## 2019-04-09 ASSESSMENT — PAIN SCALES - GENERAL: PAINLEVEL: 4=SLIGHT-MODERATE PAIN

## 2019-04-09 NOTE — LETTER
[address],   [address] [place] [address]  Phone:  [phone] - Fax:  [phone]   [facility] Progress Report and Disability Certification  Date of Service: 4/9/2019   No Show:  No  Date / Time of Next Visit: 4/30/2019 @8:31 AM   Claim Information   Patient Name: Shadi Cortez  Claim Number:     Employer:   [employer] Date of Injury: 1/19/2019     Insurer / TPA: [employer]  ID / SSN:     Occupation:   Diagnosis: The encounter diagnosis was Strain of right shoulder, subsequent encounter.    Medical Information   Related to Industrial Injury? Yes    Subjective Complaints:  DOI 1/19/2019: 34 yo male presents with right shoulder pain.  He is pulling unit on the line when another unit impacted part of the line while he is holding it causing pain in his right shoulder. Patients notes some improvement in right shoulder pain but continues to get significant numbness/tingling in the right arm.  He states the diclofenac does help but was making him drowsy and so he is currently just taking 1 tablet at night.  He also takes ibuprofen during the day to help as well.  He states he is attended 1 session of physical therapy which is helped somewhat as well.  Continues to note limited range of motion of the shoulder.   Objective Findings: Right shoulder: No gross deformity.  Minimal tenderness anterior GH/upper trapezius..  Limited range of motion to less than 90 degrees flexion and abduction.     Pre-Existing Condition(s):     Assessment:   Condition Same    Status: Additional Care Required  Permanent Disability:No    Plan:      Diagnostics:      Comments:  Referral for MRI right shoulder  Continue physical therapy  Continue diclofenac at night, and ibuprofen during the day  Continue restricted duty  Follow-up 3 weeks    Disability Information   Status: Released to Restricted Duty    From:   4/9/2019  Through: 4/30/2019 Restrictions are: Temporary   Physical Restrictions   Sitting:    Standing:    Stooping:    Bending:      Squatting:    Walking:    Climbing:    Pushing:      Pulling:    Other:    Reaching Above Shoulder (L):   Reaching Above Shoulder (R): < or = 1 hrs/day     Reaching Below Shoulder (L):    Reaching Below Shoulder (R):      Not to exceed Weight Limits   Carrying(hrs):   Weight Limit(lb): < or = to 10 pounds Lifting(hrs):   Weight  Limit(lb): < or = to 10 pounds   Comments: Restrictions apply to right arm    Repetitive Actions   Hands: i.e. Fine Manipulations from Grasping:     Feet: i.e. Operating Foot Controls:     Driving / Operate Machinery:     Physician Name: Dorian Nur D.O. Physician Signature: DORIAN Mcgregor D.O. e-Signature: Dr. Massimo Dixon, Medical Director   Clinic Name / Location: [address],   [address] [place] [address] Clinic Phone Number: Dept: [phone]   Appointment Time: 8:00 Am Visit Start Time: 8:09 AM   Check-In Time:  8:05 Am Visit Discharge Time: 8:31 AM   Original-Treating Physician or Chiropractor    Page 2-Insurer/TPA    Page 3-Employer    Page 4-Employee

## 2019-04-09 NOTE — PROGRESS NOTES
Subjective:      Shadi Cortez is a 33 y.o. male who presents with Follow-Up (WC DOI 1/19/19 (Rt) shoulder - same - rm 16)      DOI 1/19/2019: 32 yo male presents with right shoulder pain.  He is pulling unit on the line when another unit impacted part of the line while he is holding it causing pain in his right shoulder. Patients notes some improvement in right shoulder pain but continues to get significant numbness/tingling in the right arm.  He states the diclofenac does help but was making him drowsy and so he is currently just taking 1 tablet at night.  He also takes ibuprofen during the day to help as well.  He states he is attended 1 session of physical therapy which is helped somewhat as well.  Continues to note limited range of motion of the shoulder.     HPI    Review of Systems   Constitutional: Positive for malaise/fatigue.   Skin: Negative for itching and rash.   Neurological: Positive for tingling and sensory change.     SOCHX: Works as a  at [email or web address]   FH: No pertinent family history to this problem.     Objective:     /72   Pulse 88   Temp 37 °C (98.6 °F) (Temporal)   Resp 14   Ht 1.829 m (6')   Wt 77.1 kg (170 lb)   SpO2 98%   BMI 23.06 kg/m²      Physical Exam   Constitutional: He is oriented to person, place, and time. He appears well-developed and well-nourished.   Cardiovascular: Normal rate.    Pulmonary/Chest: Effort normal.   Neurological: He is alert and oriented to person, place, and time.   Skin: Skin is warm and dry.   Psychiatric: He has a normal mood and affect. Judgment normal.       Right shoulder: No gross deformity.  Minimal tenderness anterior GH/upper trapezius..  Limited range of motion to less than 90 degrees flexion and abduction.         Assessment/Plan:     1. Strain of right shoulder, subsequent encounter  - REFERRAL TO RADIOLOGY  - MR-SHOULDER-W/O RIGHT; Future    Referral for MRI right shoulder  Continue physical therapy  Continue  diclofenac at night, and ibuprofen during the day  Continue restricted duty  Follow-up 3 weeks

## 2019-04-29 ENCOUNTER — TELEPHONE (OUTPATIENT)
Dept: OCCUPATIONAL MEDICINE | Facility: CLINIC | Age: 34
End: 2019-04-29

## 2019-04-30 ENCOUNTER — OCCUPATIONAL MEDICINE (OUTPATIENT)
Dept: OCCUPATIONAL MEDICINE | Facility: CLINIC | Age: 34
End: 2019-04-30
Payer: COMMERCIAL

## 2019-04-30 VITALS
BODY MASS INDEX: 22.86 KG/M2 | RESPIRATION RATE: 14 BRPM | HEIGHT: 72 IN | OXYGEN SATURATION: 100 % | HEART RATE: 100 BPM | DIASTOLIC BLOOD PRESSURE: 76 MMHG | SYSTOLIC BLOOD PRESSURE: 124 MMHG | WEIGHT: 168.8 LBS | TEMPERATURE: 98.6 F

## 2019-04-30 DIAGNOSIS — S46.911D STRAIN OF RIGHT SHOULDER, SUBSEQUENT ENCOUNTER: ICD-10-CM

## 2019-04-30 PROCEDURE — 99213 OFFICE O/P EST LOW 20 MIN: CPT | Performed by: PREVENTIVE MEDICINE

## 2019-04-30 ASSESSMENT — ENCOUNTER SYMPTOMS
TINGLING: 1
SENSORY CHANGE: 1

## 2019-04-30 NOTE — PROGRESS NOTES
Subjective:      Shadi Cortez is a 33 y.o. male who presents with Follow-Up (WC DOI 1/19/19 (Rt) shoulder - better - rm 17)      DOI 1/19/2019: 34 yo male presents with right shoulder pain.  He is pulling unit on the line when another unit impacted part of the line while he is holding it causing pain in his right shoulder.  Patient overall is noted gradual improvement in his right shoulder pain with physical therapy.  He does continue to get numbness and tingling on the right arm when resting his arm down.  He does note improved range of motion.  And improved strength.  He feels like he can do a little bit more at work.     HPI    Review of Systems   Neurological: Positive for tingling and sensory change.     SOCHX: Works as a  at [employer]   FH: No pertinent family history to this problem.     Objective:     /76   Pulse 100   Temp 37 °C (98.6 °F)   Resp 14   Ht 1.829 m (6')   Wt 76.6 kg (168 lb 12.8 oz)   SpO2 100%   BMI 22.89 kg/m²      Physical Exam   Constitutional: He is oriented to person, place, and time. He appears well-developed and well-nourished.   Cardiovascular: Normal rate.    Pulmonary/Chest: Effort normal.   Neurological: He is alert and oriented to person, place, and time.   Skin: Skin is warm and dry.   Psychiatric: He has a normal mood and affect. Judgment normal.       Right shoulder: No gross deformity.  Minimal tenderness anterior GH/upper trapezius..  Limited range of motion to less than 150 degrees flexion and abduction.  Strength intact.    MRI right shoulder: Mild tendinosis supraspinatus tendon no other abnormalities.       Assessment/Plan:     1. Strain of right shoulder, subsequent encounter  - REFERRAL TO PHYSICAL THERAPY Reason for Therapy: Eval/Treat/Report    Referral for more physical therapy  OTC ibuprofen and or Tylenol as needed  Will lessen restrictions  Follow-up 3 weeks

## 2019-04-30 NOTE — LETTER
[address],   [address] [place] [address]  Phone:  [phone] - Fax:  [phone]   [facility] Progress Report and Disability Certification  Date of Service: 4/30/2019   No Show:  No  Date / Time of Next Visit: 5/21/2019 @ 8 am   Claim Information   Patient Name: Shadi Cortez  Claim Number:     Employer:   [employer] Date of Injury: 1/19/2019     Insurer / TPA: [employer]  ID / SSN:     Occupation:   Diagnosis: The encounter diagnosis was Strain of right shoulder, subsequent encounter.    Medical Information   Related to Industrial Injury? Yes    Subjective Complaints:  DOI 1/19/2019: 34 yo male presents with right shoulder pain.  He is pulling unit on the line when another unit impacted part of the line while he is holding it causing pain in his right shoulder.  Patient overall is noted gradual improvement in his right shoulder pain with physical therapy.  He does continue to get numbness and tingling on the right arm when resting his arm down.  He does note improved range of motion.  And improved strength.  He feels like he can do a little bit more at work.   Objective Findings: Right shoulder: No gross deformity.  Minimal tenderness anterior GH/upper trapezius..  Limited range of motion to less than 150 degrees flexion and abduction.  Strength intact.    MRI right shoulder: Mild tendinosis supraspinatus tendon no other abnormalities.   Pre-Existing Condition(s):     Assessment:   Condition Improved    Status: Additional Care Required  Permanent Disability:No    Plan:      Diagnostics:      Comments:  Referral for more physical therapy  OTC ibuprofen and or Tylenol as needed  Will lessen restrictions  Follow-up 3 weeks    Disability Information   Status: Released to Restricted Duty    From:  4/30/2019  Through: 5/21/2019 Restrictions are: Temporary   Physical Restrictions   Sitting:   Standing:    Stooping:    Bending:      Squatting:    Walking:    Climbing:    Pushing:      Pulling:    Other:    Reaching Above Shoulder (L):   Reaching Above Shoulder (R):       Reaching Below Shoulder (L):    Reaching Below Shoulder (R):      Not to exceed Weight Limits   Carrying(hrs):   Weight Limit(lb):   Lifting(hrs):   Weight  Limit(lb):     Comments: Limit push/pull/lift less than 25 pounds.  May lift/push/pull more with assistance from others    Repetitive Actions   Hands: i.e. Fine Manipulations from Grasping:     Feet: i.e. Operating Foot Controls:     Driving / Operate Machinery:     Physician Name: Dorian Nur D.O. Physician Signature: DORIAN Mcgregor D.O. e-Signature: Dr. Massimo Dixon, Medical Director   Clinic Name / Location: [address],   [address] [place] [id number] Clinic Phone Number: Dept: [phone]   Appointment Time: 8:15 Am Visit Start Time: 8:14 AM   Check-In Time:  8:13 Am Visit Discharge Time: 8:45 AM    Original-Treating Physician or Chiropractor    Page 2-Insurer/TPA    Page 3-Employer    Page 4-Employee

## 2019-05-20 ENCOUNTER — TELEPHONE (OUTPATIENT)
Dept: OCCUPATIONAL MEDICINE | Facility: CLINIC | Age: 34
End: 2019-05-20

## 2019-05-21 ENCOUNTER — OCCUPATIONAL MEDICINE (OUTPATIENT)
Dept: OCCUPATIONAL MEDICINE | Facility: CLINIC | Age: 34
End: 2019-05-21
Payer: COMMERCIAL

## 2019-05-21 VITALS
WEIGHT: 168 LBS | TEMPERATURE: 98.6 F | RESPIRATION RATE: 14 BRPM | BODY MASS INDEX: 22.75 KG/M2 | SYSTOLIC BLOOD PRESSURE: 124 MMHG | HEIGHT: 72 IN | DIASTOLIC BLOOD PRESSURE: 86 MMHG | OXYGEN SATURATION: 96 % | HEART RATE: 91 BPM

## 2019-05-21 DIAGNOSIS — S46.911D STRAIN OF RIGHT SHOULDER, SUBSEQUENT ENCOUNTER: ICD-10-CM

## 2019-05-21 PROCEDURE — 99213 OFFICE O/P EST LOW 20 MIN: CPT | Performed by: PREVENTIVE MEDICINE

## 2019-05-21 ASSESSMENT — ENCOUNTER SYMPTOMS
SENSORY CHANGE: 1
FOCAL WEAKNESS: 0
TINGLING: 1

## 2019-05-21 NOTE — LETTER
[address],   [address] [place] [address]  Phone:  [phone] - Fax:  [phone]   Occupational Health [facility] Progress Report and Disability Certification  Date of Service: 5/21/2019   No Show:  No  Date / Time of Next Visit: 6/18/2019 @800am   Claim Information   Patient Name: Shadi Cortez  Claim Number:     Employer:   [employer] Date of Injury: 1/19/2019     Insurer / TPA: [employer]  ID / SSN:     Occupation:   Diagnosis: The encounter diagnosis was Strain of right shoulder, subsequent encounter.    Medical Information   Related to Industrial Injury? Yes    Subjective Complaints:  DOI 1/19/2019: 34 yo male presents with right shoulder pain.  He is pulling unit on the line when another unit impacted part of the line while he is holding it causing pain in his right shoulder. Patient continues to note gradual improvement in right shoulder pain.  He states he is able to push without difficulty but continues to have difficulty with pulling and occasional numbness and tingling down the arm.  [facility] therapy has been helping greatly and has therapy scheduled through mid June.  He states within the current restrictions he is able to do about 90% of his current job.   Objective Findings: Right shoulder: No gross deformity.  Minimal tenderness anterior GH/upper trapezius.  Essentially full range of motion with minimal difficulty.  Strength intact.     MRI right shoulder: Mild tendinosis supraspinatus tendon no other abnormalities.   Pre-Existing Condition(s):     Assessment:   Condition Improved    Status: Additional Care Required  Permanent Disability:No    Plan:      Diagnostics:      Comments:  Continue physical therapy  OTC ibuprofen/Tylenol as needed  Continue restricted duty  Follow-up 3 weeks, if no improvement will refer to Orthopedics    Disability Information   Status: Released to Restricted Duty       From:  5/21/2019  Through: 6/18/2019 Restrictions are: Temporary   Physical Restrictions   Sitting:    Standing:    Stooping:    Bending:      Squatting:    Walking:    Climbing:    Pushing:      Pulling:    Other:    Reaching Above Shoulder (L):   Reaching Above Shoulder (R):       Reaching Below Shoulder (L):    Reaching Below Shoulder (R):      Not to exceed Weight Limits   Carrying(hrs):   Weight Limit(lb): < or = to 25 pounds Lifting(hrs):   Weight  Limit(lb): < or = to 25 pounds   Comments: Limit push/pull/lift less than 25 pounds.  May lift/push/pull more with assistance from others     Repetitive Actions   Hands: i.e. Fine Manipulations from Grasping:     Feet: i.e. Operating Foot Controls:     Driving / Operate Machinery:     Physician Name: Dorian Nur D.O. Physician Signature: DORIAN Mcgregor D.O. e-Signature: Dr. Massimo Dixon, Medical Director   Clinic Name / Location: [address],   [address] [address] Clinic Phone Number: Dept: [phone]   Appointment Time: 8:00 Am Visit Start Time: 8:08 AM   Check-In Time:  8:06 Am Visit Discharge Time:  0827am   Original-Treating Physician or Chiropractor    Page 2-Insurer/TPA    Page 3-Employer    Page 4-Employee

## 2019-05-21 NOTE — PROGRESS NOTES
Subjective:      Shadi Cortez is a 33 y.o. male who presents with Other (WC DOI 1/19/19 (Rt) shoulder - Better - RM 16)      DOI 1/19/2019: 34 yo male presents with right shoulder pain.  He is pulling unit on the line when another unit impacted part of the line while he is holding it causing pain in his right shoulder. Patient continues to note gradual improvement in right shoulder pain.  He states he is able to push without difficulty but continues to have difficulty with pulling and occasional numbness and tingling down the arm.  States therapy has been helping greatly and has therapy scheduled through mid June.  He states within the current restrictions he is able to do about 90% of his current job.     HPI    Review of Systems   Skin: Negative for itching and rash.   Neurological: Positive for tingling and sensory change. Negative for focal weakness.     SOCHX: Works as a  at "[employer]   FH: No pertinent family history to this problem.     Objective:     /86 (BP Location: Left arm, Patient Position: Sitting, BP Cuff Size: Adult)   Pulse 91   Temp 37 °C (98.6 °F) (Temporal)   Resp 14   Ht 1.829 m (6')   Wt 76.2 kg (168 lb)   SpO2 96%   BMI 22.78 kg/m²      Physical Exam   Constitutional: He is oriented to person, place, and time. He appears well-developed and well-nourished.   Cardiovascular: Normal rate.    Pulmonary/Chest: Effort normal.   Neurological: He is alert and oriented to person, place, and time.   Skin: Skin is warm and dry.   Psychiatric: He has a normal mood and affect. His behavior is normal. Judgment normal.       Right shoulder: No gross deformity.  Minimal tenderness anterior GH/upper trapezius.  Essentially full range of motion with minimal difficulty.  Strength intact.     MRI right shoulder: Mild tendinosis supraspinatus tendon no other abnormalities.       Assessment/Plan:     1. Strain of right shoulder, subsequent encounter  Continue physical  therapy  OTC ibuprofen/Tylenol as needed  Continue restricted duty  Follow-up 3 weeks, if no improvement will refer to Orthopedics

## 2019-06-17 ENCOUNTER — TELEPHONE (OUTPATIENT)
Dept: OCCUPATIONAL MEDICINE | Facility: CLINIC | Age: 34
End: 2019-06-17

## 2019-06-18 ENCOUNTER — OCCUPATIONAL MEDICINE (OUTPATIENT)
Dept: OCCUPATIONAL MEDICINE | Facility: CLINIC | Age: 34
End: 2019-06-18
Payer: COMMERCIAL

## 2019-06-18 VITALS
WEIGHT: 168 LBS | SYSTOLIC BLOOD PRESSURE: 116 MMHG | BODY MASS INDEX: 22.75 KG/M2 | TEMPERATURE: 98 F | RESPIRATION RATE: 14 BRPM | OXYGEN SATURATION: 99 % | HEIGHT: 72 IN | HEART RATE: 72 BPM | DIASTOLIC BLOOD PRESSURE: 64 MMHG

## 2019-06-18 DIAGNOSIS — S46.911D STRAIN OF RIGHT SHOULDER, SUBSEQUENT ENCOUNTER: ICD-10-CM

## 2019-06-18 PROCEDURE — 99212 OFFICE O/P EST SF 10 MIN: CPT | Performed by: PREVENTIVE MEDICINE

## 2019-06-18 ASSESSMENT — PAIN SCALES - GENERAL: PAINLEVEL: NO PAIN

## 2019-06-18 NOTE — PROGRESS NOTES
Subjective:      Shadi Cortez is a 33 y.o. male who presents with Follow-Up (WC DOI 1/19/19 (Rt) shoulder - Better - RM 16)      DOI 1/19/2019: 34 yo male presents with right shoulder pain.  He is pulling unit on the line when another unit impacted part of the line while he is holding it causing pain in his right shoulder.  Patient states overall right shoulder pain is much improved.  He only gets occasional tingling in the arm otherwise feels ready for release.  Patient completed physical therapy.     HPI    ROS       Objective:     /64   Pulse 72   Temp 36.7 °C (98 °F) (Temporal)   Resp 14   Ht 1.829 m (6')   Wt 76.2 kg (168 lb)   SpO2 99%   BMI 22.78 kg/m²      Physical Exam    Right shoulder: No gross deformity.  Full range of motion.    MRI right shoulder: Mild tendinosis supraspinatus tendon no other abnormalities.       Assessment/Plan:     1. Strain of right shoulder, subsequent encounter  Released from care  Full duty  Follow-up as needed

## 2019-06-18 NOTE — LETTER
[address],   [address] [place] [address]  Phone:  [phone] - Fax:  [phone]   [facility] Progress Report and Disability Certification  Date of Service: 6/18/2019   No Show:  No  Date / Time of Next Visit:  Release from care   Claim Information   Patient Name: Shadi Cortez  Claim Number:     Employer:   [employer] Date of Injury: 1/19/2019     Insurer / TPA: [employer]  ID / SSN:     Occupation:   Diagnosis: The encounter diagnosis was Strain of right shoulder, subsequent encounter.    Medical Information   Related to Industrial Injury? Yes    Subjective Complaints:  DOI 1/19/2019: 34 yo male presents with right shoulder pain.  He is pulling unit on the line when another unit impacted part of the line while he is holding it causing pain in his right shoulder.  Patient states overall right shoulder pain is much improved.  He only gets occasional tingling in the arm otherwise feels ready for release.  Patient completed physical therapy.   Objective Findings: Right shoulder: No gross deformity.  Full range of motion.    MRI right shoulder: Mild tendinosis supraspinatus tendon no other abnormalities.   Pre-Existing Condition(s):     Assessment:   Condition Improved    Status: Discharged /  MMI  Permanent Disability:No    Plan:      Diagnostics:      Comments:  Released from care  Full duty  Follow-up as needed    Disability Information   Status: Released to Full Duty    From:  6/18/2019  Through:   Restrictions are:     Physical Restrictions   Sitting:    Standing:    Stooping:    Bending:      Squatting:    Walking:    Climbing:    Pushing:      Pulling:    Other:    Reaching Above Shoulder (L):   Reaching Above Shoulder (R):       Reaching Below Shoulder (L):    Reaching Below Shoulder (R):      Not to exceed Weight Limits   Carrying(hrs):   Weight Limit(lb):   Lifting(hrs):   Weight   Limit(lb):     Comments:      Repetitive Actions   Hands: i.e. Fine Manipulations from Grasping:     Feet: i.e. Operating Foot Controls:     Driving / Operate Machinery:     Physician Name: Dorian Nur D.O. Physician Signature: [email or web address] RIGGS D.O. e-Signature: Dr. Massimo Dixon, Medical Director   Clinic Name / Location: [address],   [address]  [place] [address] Clinic Phone Number: Dept: [phone]   Appointment Time: 8:00 Am Visit Start Time: 8:02 AM   Check-In Time:  7:59 Am Visit Discharge Time: 8:31 AM   Original-Treating Physician or Chiropractor    Page 2-Insurer/TPA    Page 3-Employer    Page 4-Employee

## 2020-10-27 ENCOUNTER — HOSPITAL ENCOUNTER (OUTPATIENT)
Dept: RADIOLOGY | Facility: MEDICAL CENTER | Age: 35
End: 2020-10-27
Attending: PHYSICIAN ASSISTANT
Payer: OTHER MISCELLANEOUS

## 2020-10-27 ENCOUNTER — OCCUPATIONAL MEDICINE (OUTPATIENT)
Dept: URGENT CARE | Facility: PHYSICIAN GROUP | Age: 35
End: 2020-10-27
Payer: OTHER MISCELLANEOUS

## 2020-10-27 VITALS
OXYGEN SATURATION: 99 % | RESPIRATION RATE: 16 BRPM | DIASTOLIC BLOOD PRESSURE: 82 MMHG | SYSTOLIC BLOOD PRESSURE: 120 MMHG | TEMPERATURE: 98.4 F | HEIGHT: 72 IN | BODY MASS INDEX: 22.35 KG/M2 | HEART RATE: 80 BPM | WEIGHT: 165 LBS

## 2020-10-27 DIAGNOSIS — Z02.1 PRE-EMPLOYMENT DRUG SCREENING: ICD-10-CM

## 2020-10-27 DIAGNOSIS — S66.911A STRAIN OF RIGHT HAND, INITIAL ENCOUNTER: ICD-10-CM

## 2020-10-27 DIAGNOSIS — S66.911A STRAIN OF RIGHT HAND, INITIAL ENCOUNTER: Primary | ICD-10-CM

## 2020-10-27 LAB
AMP AMPHETAMINE: NORMAL
BREATH ALCOHOL COMMENT: NORMAL
COC COCAINE: NORMAL
INT CON NEG: NORMAL
INT CON POS: NORMAL
MET METHAMPHETAMINES: NORMAL
OPI OPIATES: NORMAL
PCP PHENCYCLIDINE: NORMAL
POC BREATHALIZER: 0 PERCENT (ref 0–0.01)
POC DRUG COMMENT 753798-OCCUPATIONAL HEALTH: NORMAL
THC: NORMAL

## 2020-10-27 PROCEDURE — 73130 X-RAY EXAM OF HAND: CPT | Mod: RT

## 2020-10-27 PROCEDURE — 82075 ASSAY OF BREATH ETHANOL: CPT | Mod: 29 | Performed by: PHYSICIAN ASSISTANT

## 2020-10-27 PROCEDURE — 99213 OFFICE O/P EST LOW 20 MIN: CPT | Mod: 29 | Performed by: PHYSICIAN ASSISTANT

## 2020-10-27 PROCEDURE — 80305 DRUG TEST PRSMV DIR OPT OBS: CPT | Mod: 29 | Performed by: PHYSICIAN ASSISTANT

## 2020-10-27 NOTE — PROGRESS NOTES
Subjective:     Shadi Cortez is a 35 y.o. male who presents for Work-Related Injury ( NEW/ DOI 10-/ R ring finger, palm pain)      DOI: 10/20/20 -patient notes on date of injury he was working on a vehicle with a CV axle and fell off of a vice causing a crush injury between that and right hand.  Patient notes initial injury to right ring finger fingernail.  He states he had a partial crack across the nail and bleeding from this area.  He was able to bandage this.  He is now 6 days status post injury.  Complains of pain with gripping and decreased  strength secondary to pain to right hand.  Patient is right-hand dominant.  He has tried no medicines for treatment.  He denies history of surgery or significant injury to right hand.  He complains of small area of paresthesia just distal to the DIPJ on right ring finger on radial side.    PMH:   No pertinent past medical history to this problem  MEDS:  Medications were reviewed in EMR  ALLERGIES:  Allergies were reviewed in EMR  SOCHX:  Works as a   FH:   No pertinent family history to this problem       Objective:     /82   Pulse 80   Temp 36.9 °C (98.4 °F) (Temporal)   Resp 16   Ht 1.829 m (6')   Wt 74.8 kg (165 lb)   SpO2 99%   BMI 22.38 kg/m²     Gen: AOx3; Head: NC AT; Eyes: PERRLA/EOM; Lungs: NLR; Cardiac: RR by periph pulse exam; right hand: Grossly normal sensation to light touch, normal interosseous strength, +2 radial pulses bilaterally, focal exam right ring finger: No erythema, full active range of motion all joints, no subungual hematoma, evident crack across fingernail, well adhered to matrix; neuro: Patient has normal sensation to light touch in ulnar and radial aspect of right ring finger until just distal to the DIPJ on radial aspect, small area, brisk normal cap refill here and throughout digit,  strength 5 out of 5 on left hand 3-5 on right.    Assessment/Plan:       1. Strain of right hand, initial  encounter  - DX-HAND 3+ RIGHT; Future  - POCT 6 Panel Urine Drug Screen  - POCT Breath Alcohol Test    2. Pre-employment drug screening    • Released to Restricted Duty FROM 10/27/2020 TO 10/30/2020  • Restricted duty with limited use of right hand with gripping, 25 pound weight limit in right hand, follow-up in 3 days for recheck   •      Differential diagnosis, natural history, supportive care, and indications for immediate follow-up discussed.

## 2020-10-27 NOTE — LETTER
[facility] [address] [facility] [place] [address]  Phone:  [phone] - Fax:  [phone]   [facility] Progress Report and Disability Certification  Date of Service: 10/27/2020   No Show:  No  Date / Time of Next Visit: 10/30/2020   Claim Information   Patient Name: Shadi Cortez  Claim Number:     Employer:    Date of Injury: 10/20/2020     Insurer / TPA:  ID / SSN:     Occupation: Tech  Diagnosis: The primary encounter diagnosis was Strain of right hand, initial encounter. A diagnosis of Pre-employment drug screening was also pertinent to this visit.    Medical Information   Related to Industrial Injury? Yes    Subjective Complaints:  DOI: 10/20/20 -patient notes on date of injury he was working on a vehicle with a CV axle and fell off of a vice causing a crush injury between that and right hand.  Patient notes initial injury to right ring finger fingernail.  He states he had a partial crack across the nail and bleeding from this area.  He was able to bandage this.  He is now 6 days status post injury.  Complains of pain with gripping and decreased  strength secondary to pain to right hand.  Patient is right-hand dominant.  He has tried no medicines for treatment.  He denies history of surgery or significant injury to right hand.  He complains of small area of paresthesia just distal to the DIPJ on right ring finger on radial side.   Objective Findings: Gen: AOx3; Head: NC AT; Eyes: PERRLA/EOM; Lungs: NLR; Cardiac: RR by periph pulse exam; right hand: Grossly normal sensation to light touch, normal interosseous strength, +2 radial pulses bilaterally, focal exam right ring finger: No erythema, full active range of motion all joints, no subungual hematoma, evident crack across fingernail, well adhered to matrix; neuro: Patient has normal sensation to light touch in ulnar and radial aspect of right ring finger until just distal to the DIPJ on radial  aspect, small area, brisk normal cap refill here and throughout digit,  strength 5 out of 5 on left hand 3-5 on right.   Pre-Existing Condition(s):     Assessment:   Initial Visit    Status: Additional Care Required  Permanent Disability:No    Plan:   Comments:Restricted duty with limited use of right hand with gripping, 25 pound weight limit in right hand, follow-up in 3 days for recheck      Diagnostics: X-ray  Comments:NEG    Comments:       Disability Information   Status: Released to Restricted Duty    From:  10/27/2020  Through: 10/30/2020 Restrictions are: Temporary   Physical Restrictions   Sitting:    Standing:    Stooping:    Bending:      Squatting:    Walking:    Climbing:    Pushing:      Pulling:    Other:    Reaching Above Shoulder (L):   Reaching Above Shoulder (R):       Reaching Below Shoulder (L):    Reaching Below Shoulder (R):      Not to exceed Weight Limits   Carrying(hrs):   Weight Limit(lb): < or = to 25 pounds  Comments:RUE Lifting(hrs):   Weight  Limit(lb): < or = to 25 pounds  Comments:RUE   Comments: Restricted duty with limited use of right hand with gripping, 25 pound weight limit in right hand, follow-up in 3 days for recheck     Repetitive Actions   Hands: i.e. Fine Manipulations from Grasping:     Feet: i.e. Operating Foot Controls:     Driving / Operate Machinery:     Provider Name:   Christiano Yun P.A.-C. Physician Signature:  Physician Name:     Clinic Name / Location: [facility] [address] [address] Clinic Phone Number: Dept: [phone]   Appointment Time: 10:40 Am Visit Start Time: 11:54 AM   Check-In Time:  10:51 Am Visit Discharge Time: 1:00 PM   Original-Treating Physician or Chiropractor    Page 2-Insurer/TPA    Page 3-Employer    Page 4-Employee

## 2020-10-27 NOTE — LETTER
EMPLOYEE’S CLAIM FOR COMPENSATION/ REPORT OF INITIAL TREATMENT  FORM C-4    EMPLOYEE’S CLAIM - PROVIDE ALL INFORMATION REQUESTED   First Name  Shadi Last Name  Dana Birthdate                    1985                Sex  male Claim Number   Home Address  [address] Age  35 y.o. Height  1.829 m (6') Weight  74.8 kg (165 lb) [facility]     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 [employer] [address] [place] [id number]]   What were you doing at the time of accident? (if applicable)  Ressembling a C/V jarett    How did this injury or occupational disease occur? (Be specific an answer in detail. Use additional sheet if necessary)  The jarett was help in a vise and the C/V jarett end fell off when I was trying to re-assemble it and it fell on my hand, my hand was on the bench.   If you believe that you have an occupational disease, when did you first have knowledge of the disability and it relationship to your employment?  N/A Witnesses to the Accident  No      Nature of Injury or Occupational Disease  Workers' Compensation  Part(s) of Body Injured or Affected  Hand (R), Finger (R), N/A    I certify that the above is true and correct to the best of my knowledge and that I have provided this information in order to obtain the  benefits of [place]  I hereby authorize any physician, chiropractor, surgeon, practitioner, or other person, any hospital, including [facility] or [facility], any medical service organization, any insurance company, or other institution or organization to release to each other, any medical or other information, including benefits paid or payable, pertinent to this injury or disease, except information relative to diagnosis, treatment and/or counseling for AIDS, psychological conditions, alcohol or controlled substances, for which I must give specific authorization.  A Photostat of this authorization shall be as valid as the original.     Date   Place   Employee’s Signature   THIS REPORT MUST BE COMPLETED AND MAILED WITHIN 3 WORKING DAYS OF TREATMENT   Place  [facility]  Name of Facility  [place]   Date  10/27/2020 Diagnosis  (S66.911A) Strain of right hand, initial encounter  (primary encounter diagnosis)  (Z02.1) Pre-employment drug screening Is there evidence the injured employee was under the              influence of alcohol and/or another controlled substance at the time of accident?   Hour  11:54 AM Description of Injury or Disease  The primary encounter diagnosis was Strain of right hand, initial encounter. A diagnosis of Pre-employment drug screening was also pertinent to this visit. No   Treatment  Restricted duty with limited use of right hand with gripping, 25 pound weight limit in right hand, follow-up in 3 days for recheck   Have you advised the patient to remain off work five days or     more? No   X-Ray Findings  Negative   If Yes   From Date  To Date      From information given by the employee, together with medical evidence, can you directly connect this injury or occupational disease as job incurred?  Yes If No Full Duty    No Modified Duty  Yes   Is additional  "medical care by a physician indicated?  Yes If Modified Duty, Specify any Limitations / Restrictions  Restricted duty with limited use of right hand with gripping, 25 pound weight limit in right hand, follow-up in 3 days for recheck    Do you know of any previous injury or disease contributing to this condition or occupational disease?                            No   Date  10/27/2020 Print Doctor’s Name   Christiano Yun P.A.-C. I certify the employer’s copy of  this form was mailed on:   Address  [address] Insurer’s Use Only     [facility]  [address]    Provider’s Tax ID Number  [id number] Telephone  Dept: [phone]      e-CHRISTIANO Singh P.A.-C.  Signature:     Degree          ORIGINAL-TREATING PHYSICIAN OR CHIROPRACTOR    PAGE 2-INSURER/TPA    PAGE 3-EMPLOYER    PAGE 4-EMPLOYEE        Form C-4 (rev.10/07)          BRIEF DESCRIPTION OF RIGHTS AND BENEFITS  (Pursuant to [place] 616C.050)    Notice of Injury or Occupational Disease (Incident Report Form C-1): If an injury or occupational disease (OD) arises out of and in the course of employment, you must provide written notice to your employer as soon as practicable, but no later than 7 days after the accident or OD. Your employer shall maintain a sufficient supply of the required forms.     Claim for Compensation (Form C-4): If medical treatment is sought, the form C-4 is available at the place of initial treatment. A completed \"Claim for Compensation\" (Form C-4) must be filed within 90 days after an accident or OD. The treating physician or chiropractor must, within 3 working days after treatment, complete and mail to the employer, the employer's insurer and third-party , the Claim for Compensation.     Medical Treatment: If you require medical treatment for your on-the-job injury or OD, you may be required to select a physician or chiropractor from a list provided by your workers’ compensation insurer, if it has " contracted with an Organization for Managed Care (MCO) or Preferred Provider Organization (PPO) or providers of health care. If your employer has not entered into a contract with an MCO or PPO, you may select a physician or chiropractor from the Panel of Physicians and Chiropractors. Any medical costs related to your industrial injury or OD will be paid by your insurer.     Temporary Total Disability (TTD): If your doctor has certified that you are unable to work for a period of at least 5 consecutive days, or 5 cumulative days in a 20-day period, or places restrictions on you that your employer does not accommodate, you may be entitled to TTD compensation.     Temporary Partial Disability (TPD): If the wage you receive upon reemployment is less than the compensation for TTD to which you are entitled, the insurer may be required to pay you TPD compensation to make up the difference. TPD can only be paid for a maximum of 24 months.     Permanent Partial Disability (PPD): When your medical condition is stable and there is an indication of a PPD as a result of your injury or OD, within 30 days, your insurer must arrange for an evaluation by a rating physician or chiropractor to determine the degree of your PPD. The amount of your PPD award depends on the date of injury, the results of the PPD evaluation and your age and wage.     Permanent Total Disability (PTD): If you are medically certified by a treating physician or chiropractor as permanently and totally disabled and have been granted a PTD status by your insurer, you are entitled to receive monthly benefits not to exceed 66 2/3% of your average monthly wage. The amount of your PTD payments is subject to reduction if you previously received a PPD award.     Vocational Rehabilitation Services: You may be eligible for vocational rehabilitation services if you are unable to return to the job due to a permanent physical impairment or permanent restrictions as a result  of your injury or occupational disease.     Transportation and Per Ish Reimbursement: You may be eligible for travel expenses and per ish associated with medical treatment.     Reopening: You may be able to reopen your claim if your condition worsens after claim closure.     Appeal Process: If you disagree with a written determination issued by the insurer or the insurer does not respond to your request, you may appeal to the Department of Administration, , by following the instructions contained in your determination letter. You must appeal the determination within 70 days from the date of the determination letter at [address], [address], [place], [place] [address], or [address], [address], [place], [place] [address]. If you disagree with the  decision, you may appeal to the Department of Administration, . You must file your appeal within 30 days from the date of the  decision letter at [address], [address], [place], [place] [address], or [address], [facility] [address], [place] [address]. If you disagree with a decision of an , you may file a petition for judicial review with the District Court. You must do so within 30 days of the Appeal Officer’s decision. You may be represented by an  at your own expense or you may contact the [facility] for possible representation.     [place]  for Injured Workers (NAIW): If you disagree with a  decision, you may request that NAIW represent you without charge at an  Hearing. For information regarding denial of benefits, you may contact the [facility] at: [address], [address] [address], [phone], or [address] [facility] [address], [place] [address], [phone]     To File a Complaint with the Division: If you wish to file a complaint with the  of the Division of Industrial  Relations (DIR), please contact the Workers’ Compensation Section, [address], [address], [place], [place] [address], telephone [phone], or [address], [place], [place] [address], telephone [phone].     For assistance with Workers’ Compensation Issues: You may contact the Office of the [employer] Consumer [employer], [address] [place] [address], [address], [place], [place] [address], Toll Free 1-613.239.5035, Web site: http://govcha.[facility].nv., E-mail [email or web address].              __________________________________________________________________                              ___________________         Employee Name / Signature                                                                                                                     Date                                                                                                                                                                                       D-2 (rev. 01/20)

## 2020-10-30 ENCOUNTER — OCCUPATIONAL MEDICINE (OUTPATIENT)
Dept: URGENT CARE | Facility: PHYSICIAN GROUP | Age: 35
End: 2020-10-30
Payer: OTHER MISCELLANEOUS

## 2020-10-30 VITALS
OXYGEN SATURATION: 98 % | RESPIRATION RATE: 15 BRPM | HEART RATE: 83 BPM | WEIGHT: 165 LBS | DIASTOLIC BLOOD PRESSURE: 70 MMHG | HEIGHT: 72 IN | TEMPERATURE: 98.1 F | BODY MASS INDEX: 22.35 KG/M2 | SYSTOLIC BLOOD PRESSURE: 104 MMHG

## 2020-10-30 DIAGNOSIS — S67.21XD CRUSHING INJURY OF RIGHT HAND, SUBSEQUENT ENCOUNTER: ICD-10-CM

## 2020-10-30 PROCEDURE — 99213 OFFICE O/P EST LOW 20 MIN: CPT | Performed by: FAMILY MEDICINE

## 2020-10-30 ASSESSMENT — PAIN SCALES - GENERAL: PAINLEVEL: 3=SLIGHT PAIN

## 2020-10-30 NOTE — LETTER
[facility] [place]  [address] [place] [address]  Phone:  [phone] - Fax:  [phone]   Occ[facility]ational [facility] Network Progress Report and Disability Certification  Date of Service: 10/30/2020   No Show:  No  Date / Time of Next Visit:     Claim Information   Patient Name: Shadi Cortez  Claim Number:     Employer:    Date of Injury: 10/20/2020     Insurer / TPA:  ID / SSN:     Occupation: Tech  Diagnosis: The encounter diagnosis was Crushing injury of right hand, subsequent encounter.    Medical Information   Related to Industrial Injury? Yes    Subjective Complaints:     DOI: 10/20/20 -patient notes on date of injury he was working on a vehicle with a CV axle and fell off of a vice causing a crush injury between that and right hand.      States pain is significantly improved.   Tolerating light duty well, he basically just notes some rt hand stiffness and reduced  strength.         Objective Findings:     Rt hand :        Left hand: Normal sensation noted. Normal strength noted.  + TTP over thenar area.   is 4+/5.   Full AROM   Pre-Existing Condition(s):     Assessment:   Condition Improved    Status: Additional Care Required  Permanent Disability:No    Plan:      Diagnostics:      Comments:       Disability Information   Status: Released to Full Duty    From:     Through:   Restrictions are:     Physical Restrictions   Sitting:    Standing:    Stooping:    Bending:      Squatting:    Walking:    Climbing:    Pushing:      Pulling:    Other:    Reaching Above Shoulder (L):   Reaching Above Shoulder (R):       Reaching Below Shoulder (L):    Reaching Below Shoulder (R):      Not to exceed Weight Limits   Carrying(hrs):   Weight Limit(lb):   Lifting(hrs):   Weight  Limit(lb):     Comments:  Crushing injury of right hand, subsequent encounter    Improved  Trial full duty    F/u one wk    Repetitive Actions   Hands: i.e. Fine Manipulations from Grasping:     Feet:  i.e. Operating Foot Controls:     Driving / Operate Machinery:     Provider Name:   Samuel Naylor M.D. Physician Signature:  Physician Name:     Clinic Name / Location: [address] [address] Clinic Phone Number: Dept: [phone]   Appointment Time: 8:00 Am Visit Start Time: 8:13 AM   Check-In Time:  8:09 Am Visit Discharge Time: 9:00 AM   Original-Treating Physician or Chiropractor    Page 2-Insurer/TPA    Page 3-Employer    Page 4-Employee

## 2020-10-30 NOTE — PROGRESS NOTES
Subjective:      Chief Complaint   Patient presents with   • Follow-Up     WC              hand Injury         DOI: 10/20/20 -patient notes on date of injury he was working on a vehicle with a CV axle and fell off of a vice causing a crush injury between that and right hand.      States pain is significantly improved.   Tolerating light duty well, he basically just notes some rt hand stiffness and reduced  strength.          Past medical history was unremarkable and not pertinent to current issue            Review of Systems   Constitutional: Negative for fever.   Respiratory: Negative for shortness of breath.    Cardiovascular: Negative for chest pain.   Neurological: Negative for tingling and numbness.   10 point ROS otherwise negative, except per HPI           Objective:     /70   Pulse 83   Temp 36.7 °C (98.1 °F)   Resp 15   Ht 1.829 m (6')   Wt 74.8 kg (165 lb)   SpO2 98%       Physical Exam   Constitutional: pt is oriented to person, place, and time. Pt appears well-developed and well-nourished. No distress.   HENT:   Head: Normocephalic and atraumatic.   Eyes: Conjunctivae are normal.   Cardiovascular: Normal rate.    Pulmonary/Chest: Effort normal.   Musculoskeletal:        Rt hand :        Left hand: Normal sensation noted. Normal strength noted.  + TTP over thenar area.   is 4+/5.   Full AROM       Neurological: pt is alert and oriented to person, place, and time.   Skin: Skin is warm. Pt is not diaphoretic. No erythema.   Nursing note and vitals reviewed.              Assessment/Plan:        1. Crushing injury of right hand, subsequent encounter    Improved  Trial full duty    F/u one wk

## 2020-11-06 ENCOUNTER — OCCUPATIONAL MEDICINE (OUTPATIENT)
Dept: URGENT CARE | Facility: PHYSICIAN GROUP | Age: 35
End: 2020-11-06
Payer: OTHER MISCELLANEOUS

## 2020-11-06 VITALS
RESPIRATION RATE: 14 BRPM | OXYGEN SATURATION: 98 % | DIASTOLIC BLOOD PRESSURE: 72 MMHG | BODY MASS INDEX: 22.35 KG/M2 | WEIGHT: 165 LBS | SYSTOLIC BLOOD PRESSURE: 110 MMHG | TEMPERATURE: 98.6 F | HEIGHT: 72 IN | HEART RATE: 80 BPM

## 2020-11-06 DIAGNOSIS — S67.21XD CRUSHING INJURY OF RIGHT HAND, SUBSEQUENT ENCOUNTER: ICD-10-CM

## 2020-11-06 PROCEDURE — 99213 OFFICE O/P EST LOW 20 MIN: CPT | Performed by: NURSE PRACTITIONER

## 2020-11-06 ASSESSMENT — ENCOUNTER SYMPTOMS
MYALGIAS: 0
FOCAL WEAKNESS: 0
TINGLING: 0
SENSORY CHANGE: 0

## 2020-11-06 NOTE — PROGRESS NOTES
Subjective:      Shadi Cortez is a 35 y.o. male who presents with Hand Pain (x2 weeks, right hand injury WC [facility])            HPI DOI: 10/20/20. Patient is here for follow up on crush injury to right hand. Has complete week of full duty and done well. No distal paresthesia or weakness. Accomodating well  Patient has no known allergies.  Current Outpatient Medications on File Prior to Visit   Medication Sig Dispense Refill   • diclofenac EC (VOLTAREN) 75 MG Tablet Delayed Response Take 1 Tab by mouth 2 times a day. (Patient not taking: Reported on 11/6/2020) 60 Tab 0     No current facility-administered medications on file prior to visit.      Social History     Socioeconomic History   • Marital status: Single     Spouse name: Not on file   • Number of children: Not on file   • Years of education: Not on file   • Highest education level: Not on file   Occupational History   • Not on file   Social Needs   • Financial resource strain: Not on file   • Food insecurity     Worry: Not on file     Inability: Not on file   • Transportation needs     Medical: Not on file     Non-medical: Not on file   Tobacco Use   • Smoking status: Current Every Day Smoker     Types: Cigarettes   • Smokeless tobacco: Never Used   Substance and Sexual Activity   • Alcohol use: No     Comment: occ   • Drug use: Yes     Types: Inhaled, Marijuana     Comment: marijuana   • Sexual activity: Not on file   Lifestyle   • Physical activity     Days per week: Not on file     Minutes per session: Not on file   • Stress: Not on file   Relationships   • Social connections     Talks on phone: Not on file     Gets together: Not on file     Attends Protestant service: Not on file     Active member of club or organization: Not on file     Attends meetings of clubs or organizations: Not on file     Relationship status: Not on file   • Intimate partner violence     Fear of current or ex partner: Not on file     Emotionally abused: Not on file      Physically abused: Not on file     Forced sexual activity: Not on file   Other Topics Concern   • Not on file   Social History Narrative   • Not on file     Breast Cancer-related family history is not on file.      Review of Systems   Musculoskeletal: Negative for myalgias.   Neurological: Negative for tingling, sensory change and focal weakness.          Objective:     /72   Pulse 80   Temp 37 °C (98.6 °F) (Temporal)   Resp 14   Ht 1.829 m (6')   Wt 74.8 kg (165 lb)   SpO2 98%   BMI 22.38 kg/m²      Physical Exam  Constitutional:       Appearance: Normal appearance. He is not ill-appearing.   Cardiovascular:      Rate and Rhythm: Normal rate and regular rhythm.      Heart sounds: No murmur.   Pulmonary:      Effort: Pulmonary effort is normal.      Breath sounds: Normal breath sounds.   Musculoskeletal: Normal range of motion.      Right hand: He exhibits normal range of motion, no bony tenderness and no swelling.   Skin:     General: Skin is warm and dry.   Neurological:      General: No focal deficit present.      Mental Status: He is alert and oriented to person, place, and time.   Psychiatric:         Mood and Affect: Mood normal.                 Assessment/Plan:        1. Crushing injury of right hand, subsequent encounter       Tolerating full duty.   released MMI.  Follow up as needed.

## 2020-11-06 NOTE — LETTER
[facility] [facility]  [address] [place] [address]  Phone:  [phone] - Fax:  [phone]   [facility] Progress Report and Disability Certification  Date of Service: 11/6/2020   No Show:  No  Date / Time of Next Visit:     Claim Information   Patient Name: Shadi Cortez  Claim Number:     Employer:    Date of Injury: 10/20/2020     Insurer / TPA: [employer]  ID / SSN:     Occupation: Tech  Diagnosis: The encounter diagnosis was Crushing injury of right hand, subsequent encounter.    Medical Information   Related to Industrial Injury? Yes   Subjective Complaints:  DOI: 10/20/20. Patient is here for follow up on crush injury to right hand. Has complete week of full duty and done well. No distal paresthesia or weakness. Accomodating well   Objective Findings: Physical Exam  Constitutional:       Appearance: Normal appearance. He is not ill-appearing.   Cardiovascular:      Rate and Rhythm: Normal rate and regular rhythm.      Heart sounds: No murmur.   Pulmonary:      Effort: Pulmonary effort is normal.      Breath sounds: Normal breath sounds.   Musculoskeletal: Normal range of motion.      Right hand: He exhibits normal range of motion, no bony tenderness and no swelling.   Skin:     General: Skin is warm and dry.   Neurological:      General: No focal deficit present.      Mental Status: He is alert and oriented to person, place, and time.   Psychiatric:         Mood and Affect: Mood normal.        Pre-Existing Condition(s):     Assessment:   Condition Improved    Status: Discharged /  MMI  Permanent Disability:No    Plan:      Diagnostics:      Comments:       Disability Information   Status: Released to Full Duty    From:     Through:   Restrictions are:     Physical Restrictions   Sitting:    Standing:    Stooping:    Bending:      Squatting:    Walking:    Climbing:    Pushing:      Pulling:    Other:    Reaching Above Shoulder (L):    Reaching Above Shoulder (R):       Reaching Below Shoulder (L):    Reaching Below Shoulder (R):      Not to exceed Weight Limits   Carrying(hrs):   Weight Limit(lb):   Lifting(hrs):   Weight  Limit(lb):     Comments:      Repetitive Actions   Hands: i.e. Fine Manipulations from Grasping:     Feet: i.e. Operating Foot Controls:     Driving / Operate Machinery:     Provider Name:   MATTEO Sanchez Physician Signature:  Physician Name:     Clinic Name / Location: [address] [address] Clinic Phone Number: Dept: [phone]   Appointment Time: 8:00 Am Visit Start Time: 8:09 AM   Check-In Time:  8:05 Am Visit Discharge Time:  8:30 AM   Original-Treating Physician or Chiropractor    Page 2-Insurer/TPA    Page 3-Employer    Page 4-Employee